# Patient Record
Sex: FEMALE | Race: OTHER | HISPANIC OR LATINO
[De-identification: names, ages, dates, MRNs, and addresses within clinical notes are randomized per-mention and may not be internally consistent; named-entity substitution may affect disease eponyms.]

---

## 2022-07-14 VITALS
RESPIRATION RATE: 18 BRPM | WEIGHT: 117.95 LBS | HEART RATE: 82 BPM | DIASTOLIC BLOOD PRESSURE: 95 MMHG | SYSTOLIC BLOOD PRESSURE: 163 MMHG | TEMPERATURE: 97 F | OXYGEN SATURATION: 100 %

## 2022-07-14 NOTE — H&P ADULT - ASSESSMENT
83 y/o Kinyarwanda speaking Female w/ PMHx HTN, HLD, DM-II (diet controlled), CVA (ischemic 3/31/21, no residual deficits), CAD (NSTEMI w/ Cardiogenic shock s/p ROSANA p/mLAD 6/4/2022 @[facility] [place] with residual disease) who in light of risk factors, CCS angina class II-III symptoms and known residual disease pt presents for staged PCI of RCA.    EKG: NSR @72bpm w/ TWI in I, aVL, deep TWI in V2-V6.  ASA 2			Mallampati class: 2         Anginal Class: 3    -No Known Allergies  -H/H = 12.7/39.3. Pt denies BRBPR, hematuria, hematochezia, melena. Pt reports good compliance w/ home ASA 81mg QD and Brilinta 90mg BID, stating her last dose of ASA was yesterday, and her last dose of Brilinta was this morning. Pt given ASA 81mg x1 and an additional Brilinta 90mg x1 pre-cath per d/w Dr. Chaparro.  -BUN/Cr = 17/1.06. EF normal. Euvolemic on exam. IV NS 250cc bolus given pre procedure    Sedation Plan:   Moderate  Patient Is Suitable Candidate For Sedation?     Yes    [employer] ID #[id number] ([email or web address])  Risks & benefits of procedure and alternative therapy have been explained to the patient including but not limited to: allergic reaction, bleeding with possible need for blood transfusion, infection, renal and vascular compromise, limb damage, arrhythmia, stroke, vessel dissection/perforation, myocardial infarction, and emergent CABG. Informed consent obtained at bedside and included in chart.

## 2022-07-14 NOTE — H&P ADULT - NSHPLABSRESULTS_GEN_ALL_CORE
12.7   6.12  )-----------( 262      ( 26 Jul 2022 08:00 )             39.3 12.7   6.12  )-----------( 262      ( 26 Jul 2022 08:00 )             39.3    07-26    140  |  106  |  17  ----------------------------<  101<H>  3.9   |  26  |  1.06    Ca    9.3      26 Jul 2022 08:17    TPro  7.6  /  Alb  4.6  /  TBili  0.6  /  DBili  x   /  AST  28  /  ALT  31  /  AlkPhos  77  07-26    PT/INR - ( 26 Jul 2022 08:00 )   PT: 12.7 sec;   INR: 1.07          PTT - ( 26 Jul 2022 08:00 )  PTT:31.9 sec

## 2022-07-14 NOTE — H&P ADULT - CARDIOVASCULAR
normal/regular rate and rhythm/S1 S2 present/no gallops/no rub/no murmur normal/regular rate and rhythm/S1 S2 present/no gallops/no rub/no murmur/vascular

## 2022-07-14 NOTE — H&P ADULT - NSICDXPASTMEDICALHX_GEN_ALL_CORE_FT
PAST MEDICAL HISTORY:  CAD (coronary artery disease) S/p NSTEMI    CVA (cerebrovascular accident)     Hyperlipidemia     Hypertension     Type 2 diabetes mellitus

## 2022-07-14 NOTE — H&P ADULT - HISTORY OF PRESENT ILLNESS
COVID:   Cardiologist: Dr Chaparro    Pharmacy:   Escort:      82 Y F with pmh HTN, HLD, DM II, CVA (Ischemic 3/31/21 residual?_), CAD (NSTEMI w/ Cardiogenic shock s/p ROSANA p/mLAD 6/4/2022 @[facility] [place] with residual disease) who presented to their cardiologist for f/u post NSTEMI and c/o chest tightness with numbness to her hands that started post cath.  Denies CORDOBA, orthopnea, PND, palpitations, diaphoresis, LE edema, dizziness, syncope, N/V/D, fever, chills.  Patient also reported ____ w/ DAPT. ECHO 6/4/22: EF 60% with grade II DD, mild-mod MR, mod TR, mild-mod PHTN.  In light of risk factors, CCS angina class II-III symptoms and known residual disease pt presents for staged PCI of RCA.        Cardiac cath @ [facility] [place] 6/4/22: LM large with mod diffuse disease, pLAD 50%, p/mLAD 99%, ostial/pLcx 80%, mLcx 90%, oRCA 90%, pRCA 95%, mRCA 80%, dRCA 80%.  ROSANA p/m LAD. EF 45% w/ inferoapical wall motion abnl.   C[facility]: [facility] [address] [facility]  Cardiologist: Dr Chaparro  Pharmacy: [facility] pharmacy  Escort: N/A, staged PCI    *Confirm meds on arrival  81 y/o Syriac speaking Female w/ PMHx HTN, HLD, DM-II (diet controlled), CVA (ischemic 3/31/21, no residual deficits), CAD (NSTEMI w/ Cardiogenic shock s/p ROSANA p/mLAD 6/4/2022 @[facility] [place] with residual disease) who presented to their cardiologist for f/u post NSTEMI and c/o chest tightness with numbness to her hands that started post cath. Since her prior procedure, pt reports good compliance w/ daily DAPT, and further denies CP, SOB, CORDOBA, palpitations, dizziness, LOC, N/V/D, fever/chills/sick contact, diaphoresis, orthopnea/PND, and leg swelling. Echo 6/4/22: EF 60% with G2DD, mild-mod MR, mod TR, mild-mod pHTN.  In light of risk factors, CCS angina class II-III symptoms and known residual disease pt presents for staged PCI of RCA.    Cardiac cath @ [facility] [place] 6/4/22: LM large with mod diffuse disease, pLAD 50%, p/mLAD 99%, ostial/pLcx 80%, mLcx 90%, oRCA 90%, pRCA 95%, mRCA 80%, dRCA 80%.  ROSANA p/m LAD. EF 45% w/ inferoapical wall motion abnl. COVID: [facility] [address] [facility]  Cardiologist: Dr Chaparro  Pharmacy: [facility] pharmacy  Escort: N/A, staged PCI    *Confirmed meds on arrival  83 y/o Citizen of [place] speaking Female w/ PMHx HTN, HLD, DM-II (diet controlled), CVA (ischemic 3/31/21, no residual deficits), CAD (NSTEMI w/ Cardiogenic shock s/p ROSANA p/mLAD 6/4/2022 @[facility] [place]) who presented to their cardiologist for f/u post NSTEMI and c/o chest tightness with numbness to her hands that started post cath. Since her prior procedure, pt reports good compliance w/ daily DAPT, and further denies CP, SOB, CORDOBA, palpitations, dizziness, LOC, N/V/D, fever/chills/sick contact, diaphoresis, orthopnea/PND, and leg swelling. Echo 6/4/22: EF 60% with G2DD, mild-mod MR, mod TR, mild-mod pHTN.  In light of risk factors, CCS angina class II-III symptoms and known residual disease pt presents for staged PCI of RCA.    Cardiac cath @ [facility] [place] 6/4/22: LM large with mod diffuse disease, pLAD 50%, p/mLAD 99%, ostial/pLcx 80%, mLcx 90%, oRCA 90%, pRCA 95%, mRCA 80%, dRCA 80%.  ROSANA p/m LAD. EF 45% w/ inferoapical wall motion abnl. COVID: Swabbed 7/23 @[facility] [address] [facility] [place]  Cardiologist: Dr Chaparro  Pharmacy: [facility] pharmacy  Escort: N/A, staged PCI    *Confirmed meds on arrival  81 y/o Tunisian speaking Female w/ PMHx HTN, HLD, DM-II (diet controlled), CVA (ischemic 3/31/21, no residual deficits), CAD (NSTEMI w/ Cardiogenic shock s/p ROSANA p/mLAD 6/4/2022 @[facility] [place] disease) who presented to their cardiologist for f/u post NSTEMI and c/o chest tightness. Since her prior procedure, pt reports good compliance w/ daily DAPT, and further denies CP, SOB, CORDOBA, palpitations, dizziness, LOC, N/V/D, fever/chills/sick contact, diaphoresis, orthopnea/PND, and leg swelling. Echo 6/4/22: EF 60% with G2DD, mild-mod MR, mod TR, mild-mod pHTN.  In light of risk factors, CCS angina class II-III symptoms and known residual disease pt presents for staged PCI of RCA.    Cardiac cath @ [facility] [place] 6/4/22: LM large with mod diffuse disease, pLAD 50%, p/mLAD 99%, ostial/pLcx 80%, mLcx 90%, oRCA 90%, pRCA 95%, mRCA 80%, dRCA 80%.  ROSANA p/m LAD. EF 45% w/ inferoapical wall motion abnl.

## 2022-07-26 ENCOUNTER — TRANSCRIPTION ENCOUNTER (OUTPATIENT)
Age: 82
End: 2022-07-26

## 2022-07-26 ENCOUNTER — INPATIENT (INPATIENT)
Facility: HOSPITAL | Age: 82
LOS: 0 days | Discharge: HOME | DRG: 247 | End: 2022-07-27
Attending: HOSPITALIST | Admitting: HOSPITALIST
Payer: MEDICARE

## 2022-07-26 LAB
A1C WITH ESTIMATED AVERAGE GLUCOSE RESULT: 4.7 % — SIGNIFICANT CHANGE UP (ref 4–5.6)
ALBUMIN SERPL ELPH-MCNC: 4.6 G/DL — SIGNIFICANT CHANGE UP (ref 3.3–5)
ALP SERPL-CCNC: 77 U/L — SIGNIFICANT CHANGE UP (ref 40–120)
ALT FLD-CCNC: 31 U/L — SIGNIFICANT CHANGE UP (ref 10–45)
ANION GAP SERPL CALC-SCNC: 8 MMOL/L — SIGNIFICANT CHANGE UP (ref 5–17)
APTT BLD: 31.9 SEC — SIGNIFICANT CHANGE UP (ref 27.5–35.5)
AST SERPL-CCNC: 28 U/L — SIGNIFICANT CHANGE UP (ref 10–40)
BASOPHILS # BLD AUTO: 0.06 K/UL — SIGNIFICANT CHANGE UP (ref 0–0.2)
BASOPHILS NFR BLD AUTO: 1 % — SIGNIFICANT CHANGE UP (ref 0–2)
BILIRUB SERPL-MCNC: 0.6 MG/DL — SIGNIFICANT CHANGE UP (ref 0.2–1.2)
BUN SERPL-MCNC: 17 MG/DL — SIGNIFICANT CHANGE UP (ref 7–23)
CALCIUM SERPL-MCNC: 9.3 MG/DL — SIGNIFICANT CHANGE UP (ref 8.4–10.5)
CHLORIDE SERPL-SCNC: 106 MMOL/L — SIGNIFICANT CHANGE UP (ref 96–108)
CHOLEST SERPL-MCNC: 109 MG/DL — SIGNIFICANT CHANGE UP
CK MB CFR SERPL CALC: 3.2 NG/ML — SIGNIFICANT CHANGE UP (ref 0–6.7)
CK SERPL-CCNC: 91 U/L — SIGNIFICANT CHANGE UP (ref 25–170)
CO2 SERPL-SCNC: 26 MMOL/L — SIGNIFICANT CHANGE UP (ref 22–31)
CREAT SERPL-MCNC: 1.06 MG/DL — SIGNIFICANT CHANGE UP (ref 0.5–1.3)
EGFR: 52 ML/MIN/1.73M2 — LOW
EOSINOPHIL # BLD AUTO: 0.2 K/UL — SIGNIFICANT CHANGE UP (ref 0–0.5)
EOSINOPHIL NFR BLD AUTO: 3.3 % — SIGNIFICANT CHANGE UP (ref 0–6)
ESTIMATED AVERAGE GLUCOSE: 88 MG/DL — SIGNIFICANT CHANGE UP (ref 68–114)
GLUCOSE BLDC GLUCOMTR-MCNC: 104 MG/DL — HIGH (ref 70–99)
GLUCOSE BLDC GLUCOMTR-MCNC: 106 MG/DL — HIGH (ref 70–99)
GLUCOSE BLDC GLUCOMTR-MCNC: 89 MG/DL — SIGNIFICANT CHANGE UP (ref 70–99)
GLUCOSE BLDC GLUCOMTR-MCNC: 91 MG/DL — SIGNIFICANT CHANGE UP (ref 70–99)
GLUCOSE SERPL-MCNC: 101 MG/DL — HIGH (ref 70–99)
HCT VFR BLD CALC: 39.3 % — SIGNIFICANT CHANGE UP (ref 34.5–45)
HDLC SERPL-MCNC: 58 MG/DL — SIGNIFICANT CHANGE UP
HGB BLD-MCNC: 12.7 G/DL — SIGNIFICANT CHANGE UP (ref 11.5–15.5)
IMM GRANULOCYTES NFR BLD AUTO: 0.2 % — SIGNIFICANT CHANGE UP (ref 0–1.5)
INR BLD: 1.07 — SIGNIFICANT CHANGE UP (ref 0.88–1.16)
ISTAT ACTK (ACTIVATED CLOTTING TIME KAOLIN): 294 SEC — HIGH (ref 74–137)
ISTAT INR: 1.3 — HIGH (ref 0.88–1.16)
ISTAT PT: 15.7 SEC — HIGH (ref 10–12.9)
LIPID PNL WITH DIRECT LDL SERPL: 37 MG/DL — SIGNIFICANT CHANGE UP
LYMPHOCYTES # BLD AUTO: 1.96 K/UL — SIGNIFICANT CHANGE UP (ref 1–3.3)
LYMPHOCYTES # BLD AUTO: 32 % — SIGNIFICANT CHANGE UP (ref 13–44)
MCHC RBC-ENTMCNC: 30.2 PG — SIGNIFICANT CHANGE UP (ref 27–34)
MCHC RBC-ENTMCNC: 32.3 GM/DL — SIGNIFICANT CHANGE UP (ref 32–36)
MCV RBC AUTO: 93.6 FL — SIGNIFICANT CHANGE UP (ref 80–100)
MONOCYTES # BLD AUTO: 0.73 K/UL — SIGNIFICANT CHANGE UP (ref 0–0.9)
MONOCYTES NFR BLD AUTO: 11.9 % — SIGNIFICANT CHANGE UP (ref 2–14)
NEUTROPHILS # BLD AUTO: 3.16 K/UL — SIGNIFICANT CHANGE UP (ref 1.8–7.4)
NEUTROPHILS NFR BLD AUTO: 51.6 % — SIGNIFICANT CHANGE UP (ref 43–77)
NON HDL CHOLESTEROL: 51 MG/DL — SIGNIFICANT CHANGE UP
NRBC # BLD: 0 /100 WBCS — SIGNIFICANT CHANGE UP (ref 0–0)
PLATELET # BLD AUTO: 262 K/UL — SIGNIFICANT CHANGE UP (ref 150–400)
POCT ISTAT CREATININE: 1 MG/DL — SIGNIFICANT CHANGE UP (ref 0.5–1.3)
POTASSIUM SERPL-MCNC: 3.9 MMOL/L — SIGNIFICANT CHANGE UP (ref 3.5–5.3)
POTASSIUM SERPL-SCNC: 3.9 MMOL/L — SIGNIFICANT CHANGE UP (ref 3.5–5.3)
PROT SERPL-MCNC: 7.6 G/DL — SIGNIFICANT CHANGE UP (ref 6–8.3)
PROTHROM AB SERPL-ACNC: 12.7 SEC — SIGNIFICANT CHANGE UP (ref 10.5–13.4)
RBC # BLD: 4.2 M/UL — SIGNIFICANT CHANGE UP (ref 3.8–5.2)
RBC # FLD: 13.2 % — SIGNIFICANT CHANGE UP (ref 10.3–14.5)
SODIUM SERPL-SCNC: 140 MMOL/L — SIGNIFICANT CHANGE UP (ref 135–145)
TRIGL SERPL-MCNC: 69 MG/DL — SIGNIFICANT CHANGE UP
WBC # BLD: 6.12 K/UL — SIGNIFICANT CHANGE UP (ref 3.8–10.5)
WBC # FLD AUTO: 6.12 K/UL — SIGNIFICANT CHANGE UP (ref 3.8–10.5)

## 2022-07-26 PROCEDURE — 93454 CORONARY ARTERY ANGIO S&I: CPT | Mod: 26,59

## 2022-07-26 PROCEDURE — 92978 ENDOLUMINL IVUS OCT C 1ST: CPT | Mod: 26,RC

## 2022-07-26 PROCEDURE — 93010 ELECTROCARDIOGRAM REPORT: CPT

## 2022-07-26 PROCEDURE — 99223 1ST HOSP IP/OBS HIGH 75: CPT | Mod: 25

## 2022-07-26 PROCEDURE — 0715T: CPT

## 2022-07-26 PROCEDURE — 99152 MOD SED SAME PHYS/QHP 5/>YRS: CPT

## 2022-07-26 PROCEDURE — 92928 PRQ TCAT PLMT NTRAC ST 1 LES: CPT | Mod: RC

## 2022-07-26 RX ORDER — SODIUM CHLORIDE 9 MG/ML
500 INJECTION INTRAMUSCULAR; INTRAVENOUS; SUBCUTANEOUS
Refills: 0 | Status: DISCONTINUED | OUTPATIENT
Start: 2022-07-26 | End: 2022-07-27

## 2022-07-26 RX ORDER — TICAGRELOR 90 MG/1
90 TABLET ORAL DAILY
Refills: 0 | Status: DISCONTINUED | OUTPATIENT
Start: 2022-07-27 | End: 2022-07-27

## 2022-07-26 RX ORDER — LEVOTHYROXINE SODIUM 125 MCG
125 TABLET ORAL DAILY
Refills: 0 | Status: DISCONTINUED | OUTPATIENT
Start: 2022-07-27 | End: 2022-07-27

## 2022-07-26 RX ORDER — DEXTROSE 50 % IN WATER 50 %
25 SYRINGE (ML) INTRAVENOUS ONCE
Refills: 0 | Status: DISCONTINUED | OUTPATIENT
Start: 2022-07-26 | End: 2022-07-27

## 2022-07-26 RX ORDER — LEVOTHYROXINE SODIUM 125 MCG
1 TABLET ORAL
Qty: 0 | Refills: 0 | DISCHARGE

## 2022-07-26 RX ORDER — LOSARTAN POTASSIUM 100 MG/1
25 TABLET, FILM COATED ORAL ONCE
Refills: 0 | Status: COMPLETED | OUTPATIENT
Start: 2022-07-26 | End: 2022-07-26

## 2022-07-26 RX ORDER — TICAGRELOR 90 MG/1
90 TABLET ORAL ONCE
Refills: 0 | Status: COMPLETED | OUTPATIENT
Start: 2022-07-26 | End: 2022-07-26

## 2022-07-26 RX ORDER — DEXTROSE 50 % IN WATER 50 %
12.5 SYRINGE (ML) INTRAVENOUS ONCE
Refills: 0 | Status: DISCONTINUED | OUTPATIENT
Start: 2022-07-26 | End: 2022-07-27

## 2022-07-26 RX ORDER — SODIUM CHLORIDE 9 MG/ML
1000 INJECTION, SOLUTION INTRAVENOUS
Refills: 0 | Status: DISCONTINUED | OUTPATIENT
Start: 2022-07-26 | End: 2022-07-27

## 2022-07-26 RX ORDER — ATORVASTATIN CALCIUM 80 MG/1
40 TABLET, FILM COATED ORAL AT BEDTIME
Refills: 0 | Status: DISCONTINUED | OUTPATIENT
Start: 2022-07-26 | End: 2022-07-27

## 2022-07-26 RX ORDER — SODIUM CHLORIDE 9 MG/ML
250 INJECTION INTRAMUSCULAR; INTRAVENOUS; SUBCUTANEOUS ONCE
Refills: 0 | Status: COMPLETED | OUTPATIENT
Start: 2022-07-26 | End: 2022-07-26

## 2022-07-26 RX ORDER — ASPIRIN/CALCIUM CARB/MAGNESIUM 324 MG
81 TABLET ORAL ONCE
Refills: 0 | Status: COMPLETED | OUTPATIENT
Start: 2022-07-26 | End: 2022-07-26

## 2022-07-26 RX ORDER — METOPROLOL TARTRATE 50 MG
50 TABLET ORAL DAILY
Refills: 0 | Status: DISCONTINUED | OUTPATIENT
Start: 2022-07-27 | End: 2022-07-27

## 2022-07-26 RX ORDER — INSULIN LISPRO 100/ML
VIAL (ML) SUBCUTANEOUS ONCE
Refills: 0 | Status: DISCONTINUED | OUTPATIENT
Start: 2022-07-26 | End: 2022-07-26

## 2022-07-26 RX ORDER — PANTOPRAZOLE SODIUM 20 MG/1
1 TABLET, DELAYED RELEASE ORAL
Qty: 0 | Refills: 0 | DISCHARGE

## 2022-07-26 RX ORDER — INSULIN LISPRO 100/ML
VIAL (ML) SUBCUTANEOUS ONCE
Refills: 0 | Status: COMPLETED | OUTPATIENT
Start: 2022-07-26 | End: 2022-07-26

## 2022-07-26 RX ORDER — ASPIRIN/CALCIUM CARB/MAGNESIUM 324 MG
81 TABLET ORAL DAILY
Refills: 0 | Status: DISCONTINUED | OUTPATIENT
Start: 2022-07-27 | End: 2022-07-27

## 2022-07-26 RX ORDER — DEXTROSE 50 % IN WATER 50 %
15 SYRINGE (ML) INTRAVENOUS ONCE
Refills: 0 | Status: DISCONTINUED | OUTPATIENT
Start: 2022-07-26 | End: 2022-07-27

## 2022-07-26 RX ORDER — LOSARTAN POTASSIUM 100 MG/1
25 TABLET, FILM COATED ORAL DAILY
Refills: 0 | Status: DISCONTINUED | OUTPATIENT
Start: 2022-07-26 | End: 2022-07-27

## 2022-07-26 RX ORDER — IPRATROPIUM BROMIDE 0.2 MG/ML
500 SOLUTION, NON-ORAL INHALATION ONCE
Refills: 0 | Status: COMPLETED | OUTPATIENT
Start: 2022-07-26 | End: 2022-07-26

## 2022-07-26 RX ORDER — GLUCAGON INJECTION, SOLUTION 0.5 MG/.1ML
1 INJECTION, SOLUTION SUBCUTANEOUS ONCE
Refills: 0 | Status: DISCONTINUED | OUTPATIENT
Start: 2022-07-26 | End: 2022-07-27

## 2022-07-26 RX ORDER — CHLORHEXIDINE GLUCONATE 213 G/1000ML
1 SOLUTION TOPICAL ONCE
Refills: 0 | Status: DISCONTINUED | OUTPATIENT
Start: 2022-07-26 | End: 2022-07-26

## 2022-07-26 RX ORDER — METOPROLOL TARTRATE 50 MG
1 TABLET ORAL
Qty: 0 | Refills: 0 | DISCHARGE

## 2022-07-26 RX ADMIN — TICAGRELOR 90 MILLIGRAM(S): 90 TABLET ORAL at 08:38

## 2022-07-26 RX ADMIN — LOSARTAN POTASSIUM 25 MILLIGRAM(S): 100 TABLET, FILM COATED ORAL at 18:46

## 2022-07-26 RX ADMIN — Medication 500 MICROGRAM(S): at 23:19

## 2022-07-26 RX ADMIN — SODIUM CHLORIDE 1000 MILLILITER(S): 9 INJECTION INTRAMUSCULAR; INTRAVENOUS; SUBCUTANEOUS at 09:11

## 2022-07-26 RX ADMIN — LOSARTAN POTASSIUM 25 MILLIGRAM(S): 100 TABLET, FILM COATED ORAL at 20:48

## 2022-07-26 RX ADMIN — ATORVASTATIN CALCIUM 40 MILLIGRAM(S): 80 TABLET, FILM COATED ORAL at 21:45

## 2022-07-26 RX ADMIN — Medication 81 MILLIGRAM(S): at 08:38

## 2022-07-26 RX ADMIN — SODIUM CHLORIDE 100 MILLILITER(S): 9 INJECTION INTRAMUSCULAR; INTRAVENOUS; SUBCUTANEOUS at 11:08

## 2022-07-26 NOTE — PATIENT PROFILE ADULT - FALL HARM RISK - HARM RISK INTERVENTIONS

## 2022-07-26 NOTE — DISCHARGE NOTE PROVIDER - CARE PROVIDER_API CALL
Russell Chaparro (DO)  Cardiovascular Disease; Interventional Cardiology  [address] [place] [address]  Phone: [phone]  Fax: [phone]  Established Patient  Follow Up Time: 2 weeks

## 2022-07-26 NOTE — DISCHARGE NOTE PROVIDER - NSDCMRMEDTOKEN_GEN_ALL_CORE_FT
Aspirin Enteric Coated 81 mg oral delayed release tablet: 1 tab(s) orally once a day  Brilinta (ticagrelor) 90 mg oral tablet: 1 tab(s) orally 2 times a day  Crestor 20 mg oral tablet: 1 tab(s) orally once a day  losartan 25 mg oral tablet: 1 tab(s) orally once a day  Synthroid 125 mcg (0.125 mg) oral tablet: 1 tab(s) orally once a day  Toprol-XL 50 mg oral tablet, extended release: 1 tab(s) orally once a day   Aspirin Enteric Coated 81 mg oral delayed release tablet: 1 tab(s) orally once a day  Brilinta (ticagrelor) 90 mg oral tablet: 1 tab(s) orally 2 times a day  Cardiac Rehab: Cardiac Rehab for dx CAD s/p stent placement: 3 days/week x 12 weeks     Cardiologist: Dr. Chaparro   Crestor 20 mg oral tablet: 1 tab(s) orally once a day  losartan 25 mg oral tablet: 1 tab(s) orally once a day  Synthroid 125 mcg (0.125 mg) oral tablet: 1 tab(s) orally once a day  Toprol-XL 50 mg oral tablet, extended release: 1 tab(s) orally once a day   Adult Aspirin Regimen 81 mg oral delayed release tablet: 1 tab(s) orally once a day  Brilinta (ticagrelor) 90 mg oral tablet: 1 tab(s) orally 2 times a day  Cardiac Rehab: Cardiac Rehab for dx CAD s/p stent placement: 3 days/week x 12 weeks     Cardiologist: Dr. Shankar Berry 50 mg oral tablet: 1 tab(s) orally once a day  Crestor 20 mg oral tablet: 1 tab(s) orally once a day  Synthroid 125 mcg (0.125 mg) oral tablet: 1 tab(s) orally once a day  Toprol-XL 50 mg oral tablet, extended release: 1 tab(s) orally once a day

## 2022-07-26 NOTE — DISCHARGE NOTE PROVIDER - NSDCFUADDINST_GEN_ALL_CORE_FT
You do not need to keep your access site (wrist, groin, or both) covered any more.  No lifting >5 pounds for 5 days.  No baths/swimming/soaking your access site for 5 days.  You may shower and wash your hand/wrist with soap and water gently, but do not scrub aggressively.  For any concerns please contact [phone], or go to your nearest Emergency Department. - Aspirin and Brilinta can put you at increased risk of bleeding; please avoid NSAIDS (such as Motrin, Advil, Ibuprofen, Naproxen, or Aleve, as these medications may further your risk of bleeding  - We have provided you with a prescription for cardiac rehab which is medically supervised exercise program for your heart and has been shown to improve the quantity and quality of life of people with heart disease like yours.   - You should attend cardiac rehab 3 times per week for 12 weeks. We have provided you with a list of nearby facilities. Please call your insurance carrier to determine which of these facilities are covered under your plan. Please bring this prescription with you to your follow up appointment with your cardiologist who can then further assist you to enroll into a cardiac rehab program.

## 2022-07-26 NOTE — CHART NOTE - NSCHARTNOTEFT_GEN_A_CORE
At 10:00 PM PA called to bedside, /80, HR 88bpm. Pt received additional Losartan 25mg PO x 1 at 8 PM. Pt c/o palpitations and wheezing. Repeat ECG grossly unchanged from prior. Lungs CTA however audible expiratory wheezing, R radial site stable. Will order Ipratropium neb x 1 and continue to monitor. At 10:00 PM PA called to bedside, spoke to pt in Indonesian. /80, HR 88bpm. Pt received additional Losartan 25mg PO x 1 at 8 PM. Pt is anxious about her BP being high, c/o palpitations and wheezing. No h/o Asthma/COPD. Repeat ECG grossly unchanged from prior. Lungs CTA however audible expiratory wheezing, R radial site stable. Will order Ipratropium neb x 1 and continue to monitor.

## 2022-07-26 NOTE — DISCHARGE NOTE PROVIDER - HOSPITAL COURSE
**Incomplete - needs med req and cardiac rehab rx**    81 y/o Romanian speaking Female w/ PMHx HTN, HLD, DM-II (diet controlled), CVA (ischemic 3/31/21, no residual deficits), CAD (NSTEMI w/ Cardiogenic shock s/p ROSANA p/mLAD 6/4/2022 @[facility] [place] with residual disease) who presented to their cardiologist for f/u post NSTEMI and c/o chest tightness. Since her prior procedure, pt reports good compliance w/ daily DAPT, and further denies CP, SOB, CORDOBA, palpitations, dizziness, LOC, N/V/D, fever/chills/sick contact, diaphoresis, orthopnea/PND, and leg swelling. Echo 6/4/22: EF 60% with G2DD, mild-mod MR, mod TR, mild-mod pHTN.  In light of risk factors, CCS angina class II-III symptoms and known residual disease pt presents for staged PCI of RCA.    s/p cardiac cath 7/26/22: IVUS guided shockwave/ROSANA x1 mRCA, ROSANA x1 to pRCA. Other findings: p-mLAD patent stents, oLCx 70%, dLCx 70%   Access: R radial artery s/p DSTAT band; no hematoma/bleeding.    Pt is asymptomatic at this time and denies chest pain, SOB, CORDOBA, palpitations, dizziness, LOC, N/V, diaphoresis, orthopnea/PND, and leg swelling. Pt able to ambulate and void without complication. VSS. Labs and telemetry reviewed. R radial access site stable and dressing C/D/I.  Pt is a candidate for discharge per Dr. Briceño. Pt given appropriate discharge instructions, pt states they have an appropriate amount of their previous home meds unchanged from this visit at home, and any new medications were sent to their pharmacy. Pt instructed to f/u with Cardiologist Dr. Chaparro in 1-2 weeks. **Incomplete - needs med req and cardiac rehab rx**    83 y/o Polish speaking Female w/ PMHx HTN, HLD, DM-II (diet controlled), CVA (ischemic 3/31/21, no residual deficits), CAD (NSTEMI w/ Cardiogenic shock s/p ROSANA p/mLAD 6/4/2022 @[facility] [place] with residual disease) who presented to their cardiologist for f/u post NSTEMI and c/o chest tightness. Since her prior procedure, pt reports good compliance w/ daily DAPT, and further denies CP, SOB, CORDOBA, palpitations, dizziness, LOC, N/V/D, fever/chills/sick contact, diaphoresis, orthopnea/PND, and leg swelling. Echo 6/4/22: EF 60% with G2DD, mild-mod MR, mod TR, mild-mod pHTN.  In light of risk factors, CCS angina class II-III symptoms and known residual disease pt presents for staged PCI of RCA.    s/p cardiac cath 7/26/22: IVUS guided shockwave/ROSANA x1 mRCA, ROSANA x1 to pRCA. Other findings: p-mLAD patent stents, oLCx 70%, dLCx 70%   Access: R radial artery s/p DSTAT band; no hematoma/bleeding.    Per d/w IC Fellow Dr. Brunner, no plan for staged PCI of LCx disease unless pt remains symptomatic or otherwise clinically indicated.    Pt is asymptomatic at this time and denies chest pain, SOB, CORDOBA, palpitations, dizziness, LOC, N/V, diaphoresis, orthopnea/PND, and leg swelling. Pt able to ambulate and void without complication. VSS. Labs and telemetry reviewed. R radial access site stable and dressing C/D/I.  Pt is a candidate for discharge per Dr. Briceño. Pt given appropriate discharge instructions, pt states they have an appropriate amount of their previous home meds unchanged from this visit at home, and any new medications were sent to their pharmacy. Pt instructed to f/u with Cardiologist Dr. Chaparro in 1-2 weeks. 81 y/o Turkish speaking Female w/ PMHx HTN, HLD, DM-II (diet controlled), CVA (ischemic 3/31/21, no residual deficits), CAD (NSTEMI w/ Cardiogenic shock s/p ROSANA p/mLAD 6/4/2022 @[facility] [place] with residual disease) who presented to their cardiologist for f/u post NSTEMI and c/o chest tightness. Since her prior procedure, pt reports good compliance w/ daily DAPT, and further denies CP, SOB, CORDOBA, palpitations, dizziness, LOC, N/V/D, fever/chills/sick contact, diaphoresis, orthopnea/PND, and leg swelling. Echo 6/4/22: EF 60% with G2DD, mild-mod MR, mod TR, mild-mod pHTN.  In light of risk factors, CCS angina class II-III symptoms and known residual disease pt presents for staged PCI of RCA.    Patient now s/p cardiac cath 7/26/22: IVUS guided shockwave/ROSANA x1 mRCA, ROSANA x1 to pRCA. Other findings: p-mLAD patent stents, oLCx 70%, dLCx 70% Access: R radial artery s/p DSTAT band; no hematoma/bleeding.Per d/w IC Fellow Dr. Brunner, no plan for staged PCI of LCx disease unless pt remains symptomatic or otherwise clinically indicated.    Pt is asymptomatic at this time and denies chest pain, SOB, CORDOBA, palpitations, dizziness, LOC, N/V, diaphoresis, orthopnea/PND, and leg swelling. Pt able to ambulate and void without complication. VSS. Labs and telemetry reviewed. R radial access site stable and dressing C/D/I.  Pt is a candidate for discharge. Pt given appropriate discharge instructions, pt states they have an appropriate amount of their previous home meds unchanged from this visit at home, and any new medications were sent to their pharmacy. Pt instructed to f/u with Cardiologist Dr. Chaparro in 1-2 weeks.             Cardiac Rehab (STEMI/NSTEMI/ACS/Unstable Angina/CHF/post-PCI):            *Education on benefits of Cardiac Rehab provided to patient: Yes         *Referral and Prescription Given for Cardiac Rehab : Yes         *Pt given list of locations & instructed to contact their insurance company to review list of participating providers    DC Meds: 83 y/o Maltese speaking Female w/ PMHx HTN, HLD, DM-II (diet controlled), CVA (ischemic 3/31/21, no residual deficits), CAD (NSTEMI w/ Cardiogenic shock s/p ROSANA p/mLAD 6/4/2022 @[facility] [place] with residual disease) who presented to their cardiologist for f/u post NSTEMI and c/o chest tightness. Since her prior procedure, pt reports good compliance w/ daily DAPT, and further denies CP, SOB, CORDOBA, palpitations, dizziness, LOC, N/V/D, fever/chills/sick contact, diaphoresis, orthopnea/PND, and leg swelling. Echo 6/4/22: EF 60% with G2DD, mild-mod MR, mod TR, mild-mod pHTN.  In light of risk factors, CCS angina class II-III symptoms and known residual disease pt presents for staged PCI of RCA.    Patient now s/p cardiac cath 7/26/22: IVUS guided shockwave/ROSANA x1 mRCA, ROSANA x1 to pRCA. Other findings: p-mLAD patent stents, oLCx 70%, dLCx 70% Access: R radial artery s/p DSTAT band; no hematoma/bleeding.Per d/w IC Fellow Dr. Brunner, no plan for staged PCI of LCx disease unless pt remains symptomatic or otherwise clinically indicated.    Pt is asymptomatic at this time and denies chest pain, SOB, CORDOBA, palpitations, dizziness, LOC, N/V, diaphoresis, orthopnea/PND, and leg swelling. Pt able to ambulate and void without complication. VSS. Labs and telemetry reviewed. R radial access site stable and dressing C/D/I.  Pt is a candidate for discharge. Pt given appropriate discharge instructions, pt states they have an appropriate amount of their previous home meds unchanged from this visit at home, and any new medications were sent to their pharmacy. Pt instructed to f/u with Cardiologist Dr. Chaparro in 1-2 weeks.             Cardiac Rehab (STEMI/NSTEMI/ACS/Unstable Angina/CHF/post-PCI):            *Education on benefits of Cardiac Rehab provided to patient: Yes         *Referral and Prescription Given for Cardiac Rehab : Yes         *Pt given list of locations & instructed to contact their insurance company to review list of participating providers    DC Meds:          83 y/o Lao speaking Female w/ PMHx HTN, HLD, DM-II (diet controlled), CVA (ischemic 3/31/21, no residual deficits), CAD (NSTEMI w/ Cardiogenic shock s/p ROSANA p/mLAD 6/4/2022 @[facility] [place] [place] with residual disease) who presented to their cardiologist for f/u post NSTEMI and c/o chest tightness. Since her prior procedure, pt reports good compliance w/ daily DAPT, and further denies CP, SOB, CORDOBA, palpitations, dizziness, LOC, N/V/D, fever/chills/sick contact, diaphoresis, orthopnea/PND, and leg swelling. Echo 6/4/22: EF 60% with G2DD, mild-mod MR, mod TR, mild-mod pHTN.  In light of risk factors, CCS angina class II-III symptoms and known residual disease pt presents for staged PCI of RCA.    Patient now s/p cardiac cath 7/26/22: IVUS guided shockwave/ROSANA x1 mRCA, ROSANA x1 to pRCA. Other findings: p-mLAD patent stents, oLCx 70%, dLCx 70% Access: R radial artery s/p DSTAT band; no hematoma/bleeding.Per d/w IC Fellow Dr. Brunner, no plan for staged PCI of LCx disease unless pt remains symptomatic or otherwise clinically indicated.    Pt is asymptomatic at this time and denies chest pain, SOB, CORDOBA, palpitations, dizziness, LOC, N/V, diaphoresis, orthopnea/PND, and leg swelling. Pt able to ambulate and void without complication. VSS. Labs and telemetry reviewed. R radial access site stable and dressing C/D/I.  Pt is a candidate for discharge. Pt given appropriate discharge instructions, pt states they have an appropriate amount of their previous home meds unchanged from this visit at home, and any new medications were sent to their pharmacy. Pt instructed to f/u with Cardiologist Dr. Chaparro in 1-2 weeks.            Cardiac Rehab (STEMI/NSTEMI/ACS/Unstable Angina/CHF/post-PCI):            *Education on benefits of Cardiac Rehab provided to patient: Yes         *Referral and Prescription Given for Cardiac Rehab : Yes         *Pt given list of locations & instructed to contact their insurance company to review list of participating providers    DC Meds:     ASA 81mg  Brilinta 90mg BID  Losartan 50mg  Metoprolol Succinate 50mg  Crestor 20mg

## 2022-07-26 NOTE — PATIENT PROFILE ADULT - FUNCTIONAL SCREEN CURRENT LEVEL: COMMUNICATION, MLM
doses of ibuprofen ( from 800 to 1,600 mg total dose per day), in divided doses, with meals, depending on the size of the mass and symptoms, for the next 3-4 weeks. I will leave the final decisions regarding medication and dosing up to her medical providers. If the mass enlarges and/or becomes more symptomatic, despite medical treatment, she should be referred back for follow up and possible surgery.
0 = understands/communicates without difficulty

## 2022-07-26 NOTE — DISCHARGE NOTE PROVIDER - NSDCCPCAREPLAN_GEN_ALL_CORE_FT
PRINCIPAL DISCHARGE DIAGNOSIS  Diagnosis: CAD (coronary artery disease)  Assessment and Plan of Treatment: You have a diagnosis of coronary artery disease and received a stent to your coronary artery.  You have been started on Aspirin 81mg daily and Brilinta(Ticagrelor) 90mg Twice Daily. You MUST continue taking the daily Aspirin and Brilinta to ensure a clot does not form in your stent. DO NOT STOP THESE MEDICATIONS FOR ANY REASON UNLESS OTHERWISE INDICATED BY YOUR CARDIOLOGIST BECAUSE THIS WILL PUT YOU AT RISK FOR A HEART ATTACK. You should refrain from strenuous activity and heavy lifting for 5 days. Please make a follow up appointment with your cardiologist within 1-2 weeks of your discharge. All of your prescriptions have been sent electronically to your pharmacy.         PRINCIPAL DISCHARGE DIAGNOSIS  Diagnosis: CAD (coronary artery disease)  Assessment and Plan of Treatment: - You have a known history of coronary artery disease with stents to open the arteries and improve the blood flow to the heart.   You have a diagnosis of coronary artery disease and received a stent to your coronary artery.  You have been started on Aspirin 81mg daily and Brilinta(Ticagrelor) 90mg Twice Daily. You MUST continue taking the daily Aspirin and Brilinta to ensure a clot does not form in your stent. DO NOT STOP THESE MEDICATIONS FOR ANY REASON UNLESS OTHERWISE INDICATED BY YOUR CARDIOLOGIST BECAUSE THIS WILL PUT YOU AT RISK FOR A HEART ATTACK. You should refrain from strenuous activity and heavy lifting for 5 days. Please make a follow up appointment with your cardiologist within 1-2 weeks of your discharge. All of your prescriptions have been sent electronically to your pharmacy.         PRINCIPAL DISCHARGE DIAGNOSIS  Diagnosis: CAD (coronary artery disease)  Assessment and Plan of Treatment: - You have a known history of coronary artery disease with stents to open the arteries and improve the blood flow to the heart.  You came to the hospital for a planned caardia cath as you had known residual disease to the right coronary artery for which you received 2 STENTS.   - NEVER MISS A DOSE OF ASPIRIN OR BRILINTA. IF YOU DO, YOU ARE AT RISK OF YOUR STENTS CLOSING AND HAVING A HEART ATTACK. DO NOT STOP THESE TWO MEDICATIONS UNLESS INSTRUCTED TO DO SO BY YOUR CARDIOLOGIST.   - Do NOT drive or operate hazardous machinery for 24 hours. Limit your physical activity for 24-48 hours. Do NOT engage in sports, heavy work or heavy lifting for 72 hours.   - You MAY shower BUT no TUB BATHS, HOT TUBS OR SWIMMING FOR 5 DAYS  - Your procedure was done through your right wrist. If you observe flank bleeding from the puncture site, it is an emergency. Please put direct pressure on the site and go directly to the ER. Bleeding under the skin may also occur and a small "black and blue" may be expected. If the area appears to be expanding or swelling around the puncture site, apply manual compression and go immediately to the nearest ER. If your arm/hand becomes cool or blue and/or you are unable to move it, this must be treated as an emergency, go directly to the nearest ER. Look for signs of infection in the wrist: fever, red streaking of the arm, obvious pus formation and pain.        SECONDARY DISCHARGE DIAGNOSES  Diagnosis: Hypertension  Assessment and Plan of Treatment: - Hypertension, commonly called high blood pressure, is when the force of blood pumping through your arteries is too strong. Hypertension forces your heart to work harder to pump blood. Your arteries may become narrow or stiff. Having untreated or uncontrolled hypertension for a long period of time can cause heart attack, stroke, kidney disease, and other problems.   - Be sure to follow a low salt diet. If you have been prescribed antihypertensive medications to control your blood pressure, be sure to take them every day as prescribed and do not miss any doses, the medications do not work if they are not taken consistently. Follow up with your Primary Care Doctor and have your Blood Pressure checked.     PRINCIPAL DISCHARGE DIAGNOSIS  Diagnosis: CAD (coronary artery disease)  Assessment and Plan of Treatment: - You have a known history of coronary artery disease with stents to open the arteries and improve the blood flow to the heart.  You came to the hospital for a planned caardiac cath as you had known residual disease to the right coronary artery for which you received 2 STENTS.   - NEVER MISS A DOSE OF ASPIRIN OR BRILINTA. IF YOU DO, YOU ARE AT RISK OF YOUR STENTS CLOSING AND HAVING A HEART ATTACK. DO NOT STOP THESE TWO MEDICATIONS UNLESS INSTRUCTED TO DO SO BY YOUR CARDIOLOGIST.   - Do NOT drive or operate hazardous machinery for 24 hours. Limit your physical activity for 24-48 hours. Do NOT engage in sports, heavy work or heavy lifting for 72 hours.   - You MAY shower BUT no TUB BATHS, HOT TUBS OR SWIMMING FOR 5 DAYS  - Your procedure was done through your right wrist. If you observe flank bleeding from the puncture site, it is an emergency. Please put direct pressure on the site and go directly to the ER. Bleeding under the skin may also occur and a small "black and blue" may be expected. If the area appears to be expanding or swelling around the puncture site, apply manual compression and go immediately to the nearest ER. If your arm/hand becomes cool or blue and/or you are unable to move it, this must be treated as an emergency, go directly to the nearest ER. Look for signs of infection in the wrist: fever, red streaking of the arm, obvious pus formation and pain.        SECONDARY DISCHARGE DIAGNOSES  Diagnosis: Hypertension  Assessment and Plan of Treatment: - Hypertension, commonly called high blood pressure, is when the force of blood pumping through your arteries is too strong WHICH YOU HAVE A KNOWN HISTORY OF. Hypertension forces your heart to work harder to pump blood. Your arteries may become narrow or stiff. Having untreated or uncontrolled hypertension for a long period of time can cause heart attack, stroke, kidney disease, and other problems.   - Your blood pressure was higher than normal while you were in the hospital. You are to INCREASE Losartan 25mg to Losartan 50mg and CONTINUE Toprol XL 50mg daily.   - Be sure to follow a low salt diet. If you have been prescribed antihypertensive medications to control your blood pressure, be sure to take them every day as prescribed and do not miss any doses, the medications do not work if they are not taken consistently.

## 2022-07-27 ENCOUNTER — TRANSCRIPTION ENCOUNTER (OUTPATIENT)
Age: 82
End: 2022-07-27

## 2022-07-27 VITALS — TEMPERATURE: 98 F

## 2022-07-27 LAB
ANION GAP SERPL CALC-SCNC: 10 MMOL/L — SIGNIFICANT CHANGE UP (ref 5–17)
BUN SERPL-MCNC: 13 MG/DL — SIGNIFICANT CHANGE UP (ref 7–23)
CALCIUM SERPL-MCNC: 8.8 MG/DL — SIGNIFICANT CHANGE UP (ref 8.4–10.5)
CHLORIDE SERPL-SCNC: 106 MMOL/L — SIGNIFICANT CHANGE UP (ref 96–108)
CO2 SERPL-SCNC: 23 MMOL/L — SIGNIFICANT CHANGE UP (ref 22–31)
CREAT SERPL-MCNC: 0.73 MG/DL — SIGNIFICANT CHANGE UP (ref 0.5–1.3)
EGFR: 82 ML/MIN/1.73M2 — SIGNIFICANT CHANGE UP
GLUCOSE SERPL-MCNC: 91 MG/DL — SIGNIFICANT CHANGE UP (ref 70–99)
HCT VFR BLD CALC: 34.8 % — SIGNIFICANT CHANGE UP (ref 34.5–45)
HGB BLD-MCNC: 11.8 G/DL — SIGNIFICANT CHANGE UP (ref 11.5–15.5)
MAGNESIUM SERPL-MCNC: 1.9 MG/DL — SIGNIFICANT CHANGE UP (ref 1.6–2.6)
MCHC RBC-ENTMCNC: 30.6 PG — SIGNIFICANT CHANGE UP (ref 27–34)
MCHC RBC-ENTMCNC: 33.9 GM/DL — SIGNIFICANT CHANGE UP (ref 32–36)
MCV RBC AUTO: 90.4 FL — SIGNIFICANT CHANGE UP (ref 80–100)
NRBC # BLD: 0 /100 WBCS — SIGNIFICANT CHANGE UP (ref 0–0)
PLATELET # BLD AUTO: 218 K/UL — SIGNIFICANT CHANGE UP (ref 150–400)
POTASSIUM SERPL-MCNC: 3.5 MMOL/L — SIGNIFICANT CHANGE UP (ref 3.5–5.3)
POTASSIUM SERPL-SCNC: 3.5 MMOL/L — SIGNIFICANT CHANGE UP (ref 3.5–5.3)
RBC # BLD: 3.85 M/UL — SIGNIFICANT CHANGE UP (ref 3.8–5.2)
RBC # FLD: 13.1 % — SIGNIFICANT CHANGE UP (ref 10.3–14.5)
SODIUM SERPL-SCNC: 139 MMOL/L — SIGNIFICANT CHANGE UP (ref 135–145)
WBC # BLD: 6.77 K/UL — SIGNIFICANT CHANGE UP (ref 3.8–10.5)
WBC # FLD AUTO: 6.77 K/UL — SIGNIFICANT CHANGE UP (ref 3.8–10.5)

## 2022-07-27 PROCEDURE — 82565 ASSAY OF CREATININE: CPT

## 2022-07-27 PROCEDURE — C1725: CPT

## 2022-07-27 PROCEDURE — 80061 LIPID PANEL: CPT

## 2022-07-27 PROCEDURE — 82962 GLUCOSE BLOOD TEST: CPT

## 2022-07-27 PROCEDURE — 80053 COMPREHEN METABOLIC PANEL: CPT

## 2022-07-27 PROCEDURE — 93005 ELECTROCARDIOGRAM TRACING: CPT

## 2022-07-27 PROCEDURE — 85027 COMPLETE CBC AUTOMATED: CPT

## 2022-07-27 PROCEDURE — 80048 BASIC METABOLIC PNL TOTAL CA: CPT

## 2022-07-27 PROCEDURE — C1887: CPT

## 2022-07-27 PROCEDURE — 94640 AIRWAY INHALATION TREATMENT: CPT

## 2022-07-27 PROCEDURE — C1894: CPT

## 2022-07-27 PROCEDURE — 93010 ELECTROCARDIOGRAM REPORT: CPT

## 2022-07-27 PROCEDURE — C1761: CPT

## 2022-07-27 PROCEDURE — 82550 ASSAY OF CK (CPK): CPT

## 2022-07-27 PROCEDURE — C1874: CPT

## 2022-07-27 PROCEDURE — 36415 COLL VENOUS BLD VENIPUNCTURE: CPT

## 2022-07-27 PROCEDURE — 83036 HEMOGLOBIN GLYCOSYLATED A1C: CPT

## 2022-07-27 PROCEDURE — C1769: CPT

## 2022-07-27 PROCEDURE — C1753: CPT

## 2022-07-27 PROCEDURE — 82553 CREATINE MB FRACTION: CPT

## 2022-07-27 PROCEDURE — 83735 ASSAY OF MAGNESIUM: CPT

## 2022-07-27 PROCEDURE — 85610 PROTHROMBIN TIME: CPT

## 2022-07-27 PROCEDURE — 99239 HOSP IP/OBS DSCHRG MGMT >30: CPT

## 2022-07-27 PROCEDURE — 85730 THROMBOPLASTIN TIME PARTIAL: CPT

## 2022-07-27 PROCEDURE — 85025 COMPLETE CBC W/AUTO DIFF WBC: CPT

## 2022-07-27 PROCEDURE — 85347 COAGULATION TIME ACTIVATED: CPT

## 2022-07-27 RX ORDER — TICAGRELOR 90 MG/1
1 TABLET ORAL
Qty: 60 | Refills: 7
Start: 2022-07-27 | End: 2023-03-23

## 2022-07-27 RX ORDER — ASPIRIN/CALCIUM CARB/MAGNESIUM 324 MG
1 TABLET ORAL
Qty: 30 | Refills: 10
Start: 2022-07-27 | End: 2023-06-21

## 2022-07-27 RX ORDER — TICAGRELOR 90 MG/1
1 TABLET ORAL
Qty: 0 | Refills: 0 | DISCHARGE

## 2022-07-27 RX ORDER — ROSUVASTATIN CALCIUM 5 MG/1
1 TABLET ORAL
Qty: 0 | Refills: 0 | DISCHARGE

## 2022-07-27 RX ORDER — ROSUVASTATIN CALCIUM 5 MG/1
1 TABLET ORAL
Qty: 30 | Refills: 4
Start: 2022-07-27 | End: 2022-12-23

## 2022-07-27 RX ORDER — AMLODIPINE BESYLATE 2.5 MG/1
5 TABLET ORAL ONCE
Refills: 0 | Status: COMPLETED | OUTPATIENT
Start: 2022-07-27 | End: 2022-07-27

## 2022-07-27 RX ORDER — LOSARTAN POTASSIUM 100 MG/1
1 TABLET, FILM COATED ORAL
Qty: 30 | Refills: 1
Start: 2022-07-27 | End: 2022-09-24

## 2022-07-27 RX ORDER — MAGNESIUM OXIDE 400 MG ORAL TABLET 241.3 MG
400 TABLET ORAL ONCE
Refills: 0 | Status: COMPLETED | OUTPATIENT
Start: 2022-07-27 | End: 2022-07-27

## 2022-07-27 RX ORDER — POTASSIUM CHLORIDE 20 MEQ
40 PACKET (EA) ORAL ONCE
Refills: 0 | Status: COMPLETED | OUTPATIENT
Start: 2022-07-27 | End: 2022-07-27

## 2022-07-27 RX ORDER — LOSARTAN POTASSIUM 100 MG/1
1 TABLET, FILM COATED ORAL
Qty: 0 | Refills: 0 | DISCHARGE

## 2022-07-27 RX ORDER — ASPIRIN/CALCIUM CARB/MAGNESIUM 324 MG
1 TABLET ORAL
Qty: 0 | Refills: 0 | DISCHARGE

## 2022-07-27 RX ADMIN — MAGNESIUM OXIDE 400 MG ORAL TABLET 400 MILLIGRAM(S): 241.3 TABLET ORAL at 07:43

## 2022-07-27 RX ADMIN — Medication 125 MICROGRAM(S): at 06:42

## 2022-07-27 RX ADMIN — LOSARTAN POTASSIUM 25 MILLIGRAM(S): 100 TABLET, FILM COATED ORAL at 06:49

## 2022-07-27 RX ADMIN — TICAGRELOR 90 MILLIGRAM(S): 90 TABLET ORAL at 11:18

## 2022-07-27 RX ADMIN — Medication 81 MILLIGRAM(S): at 11:18

## 2022-07-27 RX ADMIN — Medication 40 MILLIEQUIVALENT(S): at 07:41

## 2022-07-27 RX ADMIN — Medication 50 MILLIGRAM(S): at 06:42

## 2022-07-27 RX ADMIN — AMLODIPINE BESYLATE 5 MILLIGRAM(S): 2.5 TABLET ORAL at 00:22

## 2022-07-27 NOTE — DISCHARGE NOTE NURSING/CASE MANAGEMENT/SOCIAL WORK - NSDCPEFALRISK_GEN_ALL_CORE
For information on Fall & Injury Prevention, visit: [email or web address] OR  [email or web address] OR  https://www.cdc.gov/steadi/patient.html

## 2022-07-27 NOTE — DISCHARGE NOTE NURSING/CASE MANAGEMENT/SOCIAL WORK - PATIENT PORTAL LINK FT
You can access the FollowMyHealth Patient Portal offered by [facility] by registering at the following website: [email or web address]. By joining [facility]’s FollowMyHealth portal, you will also be able to view your health information using other applications (apps) compatible with our system.

## 2022-07-30 DIAGNOSIS — I27.20 PULMONARY HYPERTENSION, UNSPECIFIED: ICD-10-CM

## 2022-07-30 DIAGNOSIS — I34.0 NONRHEUMATIC MITRAL (VALVE) INSUFFICIENCY: ICD-10-CM

## 2022-07-30 DIAGNOSIS — Z79.82 LONG TERM (CURRENT) USE OF ASPIRIN: ICD-10-CM

## 2022-07-30 DIAGNOSIS — I25.2 OLD MYOCARDIAL INFARCTION: ICD-10-CM

## 2022-07-30 DIAGNOSIS — Z79.02 LONG TERM (CURRENT) USE OF ANTITHROMBOTICS/ANTIPLATELETS: ICD-10-CM

## 2022-07-30 DIAGNOSIS — I25.84 CORONARY ATHEROSCLEROSIS DUE TO CALCIFIED CORONARY LESION: ICD-10-CM

## 2022-07-30 DIAGNOSIS — E11.9 TYPE 2 DIABETES MELLITUS WITHOUT COMPLICATIONS: ICD-10-CM

## 2022-07-30 DIAGNOSIS — Z95.5 PRESENCE OF CORONARY ANGIOPLASTY IMPLANT AND GRAFT: ICD-10-CM

## 2022-07-30 DIAGNOSIS — I25.10 ATHEROSCLEROTIC HEART DISEASE OF NATIVE CORONARY ARTERY WITHOUT ANGINA PECTORIS: ICD-10-CM

## 2022-07-30 DIAGNOSIS — I36.1 NONRHEUMATIC TRICUSPID (VALVE) INSUFFICIENCY: ICD-10-CM

## 2022-07-30 DIAGNOSIS — R06.2 WHEEZING: ICD-10-CM

## 2022-07-30 DIAGNOSIS — I10 ESSENTIAL (PRIMARY) HYPERTENSION: ICD-10-CM

## 2022-07-30 DIAGNOSIS — E78.5 HYPERLIPIDEMIA, UNSPECIFIED: ICD-10-CM

## 2022-07-30 DIAGNOSIS — Z86.73 PERSONAL HISTORY OF TRANSIENT ISCHEMIC ATTACK (TIA), AND CEREBRAL INFARCTION WITHOUT RESIDUAL DEFICITS: ICD-10-CM

## 2024-01-18 PROBLEM — Z00.00 ENCOUNTER FOR PREVENTIVE HEALTH EXAMINATION: Status: ACTIVE | Noted: 2024-01-18

## 2024-04-23 NOTE — DISCHARGE NOTE PROVIDER - NSDCACTIVITY_GEN_ALL_CORE
Dear Jose Alberto,    For now,  Please continue to give Lantus 8 units daily.    Please continue to give 1 unit : 30 grams of carbohydrate before you eat.   If you blood sugar >150, please also add correction as follows.      Correction Scale - 1 unit : 60 mg/dl >150     Do Not give Correction Insulin if Pre-Meal BG less than 150   -210 = 1 unit  -270 = 2 units.  -330 = 3 units.  -390 = 4 units.  -450 = 5 units.  BG >450 = 6 units.  To be given with mealtime insulin, and based on blood glucose before your meal.        Do Not give Bedtime Correction Insulin if BG less than 210  -270 = 1 units.  -330 = 2 units.  -390 = 3 units.  -450 = 4 units.  BG >450 = 5 units.  To be given with mealtime insulin, and based on blood glucose before your meal.      Please record doses in isamar to review with Mildred.    My best wishes,    Caro Cormier PA-C, MPAS  [facility]  Diabetes, Endocrinology, and Metabolism  [phone] Appointments/Nurse  [phone] Fax          
Showering allowed/Stairs allowed/Walking - Indoors allowed/No heavy lifting/straining/Walking - Outdoors allowed

## 2024-10-06 NOTE — DISCHARGE NOTE PROVIDER - NSDCQMAMI_CARD_ALL_CORE
Orientation to room/Bed in low position, brakes on/Side rails x 2 or 4 up, assess large gaps, such that a patient could get extremity or other body part entrapped, use additional safety procedures/Call light is within reach, educate patient/family on its functionality/Patient and family education available to parents and patient/Educate patient/parents of falls protocol precautions/Consider moving patient closer to nurses' station
No

## 2025-04-01 PROBLEM — E78.5 HYPERLIPIDEMIA, UNSPECIFIED: Chronic | Status: ACTIVE | Noted: 2022-07-14

## 2025-04-01 PROBLEM — I63.9 CEREBRAL INFARCTION, UNSPECIFIED: Chronic | Status: ACTIVE | Noted: 2022-07-14

## 2025-04-01 PROBLEM — I25.10 ATHEROSCLEROTIC HEART DISEASE OF NATIVE CORONARY ARTERY WITHOUT ANGINA PECTORIS: Chronic | Status: ACTIVE | Noted: 2022-07-14

## 2025-04-01 PROBLEM — E11.9 TYPE 2 DIABETES MELLITUS WITHOUT COMPLICATIONS: Chronic | Status: ACTIVE | Noted: 2022-07-14

## 2025-04-01 PROBLEM — I10 ESSENTIAL (PRIMARY) HYPERTENSION: Chronic | Status: ACTIVE | Noted: 2022-07-14

## 2025-04-08 ENCOUNTER — APPOINTMENT (OUTPATIENT)
Dept: FAMILY MEDICINE | Facility: CLINIC | Age: 85
End: 2025-04-08

## 2025-04-08 ENCOUNTER — NON-APPOINTMENT (OUTPATIENT)
Age: 85
End: 2025-04-08

## 2025-04-15 ENCOUNTER — APPOINTMENT (OUTPATIENT)
Dept: FAMILY MEDICINE | Facility: CLINIC | Age: 85
End: 2025-04-15

## 2025-07-09 ENCOUNTER — INPATIENT (INPATIENT)
Facility: HOSPITAL | Age: 85
LOS: 5 days | Discharge: HOME | DRG: 884 | End: 2025-07-15
Attending: STUDENT IN AN ORGANIZED HEALTH CARE EDUCATION/TRAINING PROGRAM | Admitting: HOSPITALIST
Payer: COMMERCIAL

## 2025-07-09 VITALS
RESPIRATION RATE: 18 BRPM | OXYGEN SATURATION: 96 % | DIASTOLIC BLOOD PRESSURE: 93 MMHG | SYSTOLIC BLOOD PRESSURE: 179 MMHG | HEART RATE: 95 BPM | TEMPERATURE: 98 F

## 2025-07-09 LAB
ALBUMIN SERPL ELPH-MCNC: 3.9 G/DL — SIGNIFICANT CHANGE UP (ref 3.3–5.2)
ALP SERPL-CCNC: 63 U/L — SIGNIFICANT CHANGE UP (ref 40–120)
ALT FLD-CCNC: 26 U/L — SIGNIFICANT CHANGE UP
AMPHET UR-MCNC: NEGATIVE — SIGNIFICANT CHANGE UP
ANION GAP SERPL CALC-SCNC: 15 MMOL/L — SIGNIFICANT CHANGE UP (ref 5–17)
APAP SERPL-MCNC: <3 UG/ML — LOW (ref 10–26)
APPEARANCE UR: CLEAR — SIGNIFICANT CHANGE UP
AST SERPL-CCNC: 37 U/L — HIGH
BACTERIA # UR AUTO: NEGATIVE /HPF — SIGNIFICANT CHANGE UP
BARBITURATES UR SCN-MCNC: NEGATIVE — SIGNIFICANT CHANGE UP
BASOPHILS # BLD AUTO: 0.05 K/UL — SIGNIFICANT CHANGE UP (ref 0–0.2)
BASOPHILS NFR BLD AUTO: 1.1 % — SIGNIFICANT CHANGE UP (ref 0–2)
BENZODIAZ UR-MCNC: NEGATIVE — SIGNIFICANT CHANGE UP
BILIRUB SERPL-MCNC: 0.3 MG/DL — LOW (ref 0.4–2)
BILIRUB UR-MCNC: NEGATIVE — SIGNIFICANT CHANGE UP
BUN SERPL-MCNC: 17.6 MG/DL — SIGNIFICANT CHANGE UP (ref 8–20)
CALCIUM SERPL-MCNC: 8.6 MG/DL — SIGNIFICANT CHANGE UP (ref 8.4–10.5)
CAST: 0 /LPF — SIGNIFICANT CHANGE UP (ref 0–4)
CHLORIDE SERPL-SCNC: 104 MMOL/L — SIGNIFICANT CHANGE UP (ref 96–108)
CO2 SERPL-SCNC: 23 MMOL/L — SIGNIFICANT CHANGE UP (ref 22–29)
COCAINE METAB.OTHER UR-MCNC: NEGATIVE — SIGNIFICANT CHANGE UP
COLOR SPEC: YELLOW — SIGNIFICANT CHANGE UP
CREAT SERPL-MCNC: 1.04 MG/DL — SIGNIFICANT CHANGE UP (ref 0.5–1.3)
DIFF PNL FLD: NEGATIVE — SIGNIFICANT CHANGE UP
EGFR: 52 ML/MIN/1.73M2 — LOW
EGFR: 52 ML/MIN/1.73M2 — LOW
EOSINOPHIL # BLD AUTO: 0.03 K/UL — SIGNIFICANT CHANGE UP (ref 0–0.5)
EOSINOPHIL NFR BLD AUTO: 0.6 % — SIGNIFICANT CHANGE UP (ref 0–6)
ETHANOL SERPL-MCNC: <10 MG/DL — SIGNIFICANT CHANGE UP (ref 0–9)
FENTANYL UR QL SCN: NEGATIVE — SIGNIFICANT CHANGE UP
GLUCOSE SERPL-MCNC: 113 MG/DL — HIGH (ref 70–99)
GLUCOSE UR QL: NEGATIVE MG/DL — SIGNIFICANT CHANGE UP
HCT VFR BLD CALC: 35.1 % — SIGNIFICANT CHANGE UP (ref 34.5–45)
HGB BLD-MCNC: 11.6 G/DL — SIGNIFICANT CHANGE UP (ref 11.5–15.5)
IMM GRANULOCYTES # BLD AUTO: 0.03 K/UL — SIGNIFICANT CHANGE UP (ref 0–0.07)
IMM GRANULOCYTES NFR BLD AUTO: 0.6 % — SIGNIFICANT CHANGE UP (ref 0–0.9)
KETONES UR QL: NEGATIVE MG/DL — SIGNIFICANT CHANGE UP
LEUKOCYTE ESTERASE UR-ACNC: NEGATIVE — SIGNIFICANT CHANGE UP
LYMPHOCYTES # BLD AUTO: 0.89 K/UL — LOW (ref 1–3.3)
LYMPHOCYTES NFR BLD AUTO: 18.7 % — SIGNIFICANT CHANGE UP (ref 13–44)
MCHC RBC-ENTMCNC: 31.4 PG — SIGNIFICANT CHANGE UP (ref 27–34)
MCHC RBC-ENTMCNC: 33 G/DL — SIGNIFICANT CHANGE UP (ref 32–36)
MCV RBC AUTO: 95.1 FL — SIGNIFICANT CHANGE UP (ref 80–100)
METHADONE UR-MCNC: NEGATIVE — SIGNIFICANT CHANGE UP
MONOCYTES # BLD AUTO: 0.5 K/UL — SIGNIFICANT CHANGE UP (ref 0–0.9)
MONOCYTES NFR BLD AUTO: 10.5 % — SIGNIFICANT CHANGE UP (ref 2–14)
NEUTROPHILS # BLD AUTO: 3.26 K/UL — SIGNIFICANT CHANGE UP (ref 1.8–7.4)
NEUTROPHILS NFR BLD AUTO: 68.5 % — SIGNIFICANT CHANGE UP (ref 43–77)
NITRITE UR-MCNC: NEGATIVE — SIGNIFICANT CHANGE UP
NRBC # BLD AUTO: 0 K/UL — SIGNIFICANT CHANGE UP (ref 0–0)
NRBC # FLD: 0 K/UL — SIGNIFICANT CHANGE UP (ref 0–0)
NRBC BLD AUTO-RTO: 0 /100 WBCS — SIGNIFICANT CHANGE UP (ref 0–0)
OPIATES UR-MCNC: NEGATIVE — SIGNIFICANT CHANGE UP
PCP SPEC-MCNC: SIGNIFICANT CHANGE UP
PCP UR-MCNC: NEGATIVE — SIGNIFICANT CHANGE UP
PH UR: 8 — SIGNIFICANT CHANGE UP (ref 5–8)
PLATELET # BLD AUTO: 186 K/UL — SIGNIFICANT CHANGE UP (ref 150–400)
PMV BLD: 10.4 FL — SIGNIFICANT CHANGE UP (ref 7–13)
POTASSIUM SERPL-MCNC: 3.2 MMOL/L — LOW (ref 3.5–5.3)
POTASSIUM SERPL-SCNC: 3.2 MMOL/L — LOW (ref 3.5–5.3)
PROT SERPL-MCNC: 6.8 G/DL — SIGNIFICANT CHANGE UP (ref 6.6–8.7)
PROT UR-MCNC: 100 MG/DL
RAPID RVP RESULT: SIGNIFICANT CHANGE UP
RBC # BLD: 3.69 M/UL — LOW (ref 3.8–5.2)
RBC # FLD: 13.7 % — SIGNIFICANT CHANGE UP (ref 10.3–14.5)
RBC CASTS # UR COMP ASSIST: 1 /HPF — SIGNIFICANT CHANGE UP (ref 0–4)
SALICYLATES SERPL-MCNC: <0.6 MG/DL — LOW (ref 10–20)
SARS-COV-2 RNA SPEC QL NAA+PROBE: SIGNIFICANT CHANGE UP
SODIUM SERPL-SCNC: 142 MMOL/L — SIGNIFICANT CHANGE UP (ref 135–145)
SP GR SPEC: 1.01 — SIGNIFICANT CHANGE UP (ref 1–1.03)
SQUAMOUS # UR AUTO: 0 /HPF — SIGNIFICANT CHANGE UP (ref 0–5)
THC UR QL: NEGATIVE — SIGNIFICANT CHANGE UP
UROBILINOGEN FLD QL: 1 MG/DL — SIGNIFICANT CHANGE UP (ref 0.2–1)
WBC # BLD: 4.76 K/UL — SIGNIFICANT CHANGE UP (ref 3.8–10.5)
WBC # FLD AUTO: 4.76 K/UL — SIGNIFICANT CHANGE UP (ref 3.8–10.5)
WBC UR QL: 0 /HPF — SIGNIFICANT CHANGE UP (ref 0–5)

## 2025-07-09 PROCEDURE — 99223 1ST HOSP IP/OBS HIGH 75: CPT

## 2025-07-09 RX ORDER — HALOPERIDOL 10 MG/1
5 TABLET ORAL ONCE
Refills: 0 | Status: COMPLETED | OUTPATIENT
Start: 2025-07-09 | End: 2025-07-09

## 2025-07-09 RX ORDER — OLANZAPINE 10 MG/1
5 TABLET ORAL AT BEDTIME
Refills: 0 | Status: DISCONTINUED | OUTPATIENT
Start: 2025-07-09 | End: 2025-07-15

## 2025-07-09 RX ORDER — LORAZEPAM 4 MG/ML
0.5 VIAL (ML) INJECTION ONCE
Refills: 0 | Status: DISCONTINUED | OUTPATIENT
Start: 2025-07-09 | End: 2025-07-09

## 2025-07-09 RX ORDER — OLANZAPINE 10 MG/1
5 TABLET ORAL ONCE
Refills: 0 | Status: COMPLETED | OUTPATIENT
Start: 2025-07-09 | End: 2025-07-09

## 2025-07-09 RX ADMIN — OLANZAPINE 5 MILLIGRAM(S): 10 TABLET ORAL at 07:00

## 2025-07-09 RX ADMIN — Medication 0.5 MILLIGRAM(S): at 07:49

## 2025-07-09 RX ADMIN — HALOPERIDOL 5 MILLIGRAM(S): 10 TABLET ORAL at 07:16
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
## 2025-07-09 NOTE — ED CDU PROVIDER INITIAL DAY NOTE - CLINICAL SUMMARY MEDICAL DECISION MAKING FREE TEXT BOX
86yo F with dementia and unknown medical issues presents to the ED following an argument with family. Patient is Mohawk speaker, and interviewed via in-person . Patient unable to explain why she is here. Based on triage note, patient was in argument and Patient was left in the ED. Patient is oriented to self only.    patient cleared by psych, will need 5mg Zyprexa nightly. patient agreeable. SW looking for placement for MICHAEL for patient.    will obs until placemnt

## 2025-07-09 NOTE — PHYSICAL THERAPY INITIAL EVALUATION ADULT - GENERAL OBSERVATIONS, REHAB EVAL
awake on stretcher on room air,+telemonitor with , +primafit. Armenian speaking gualberto CLAROS, present and assisted with communication

## 2025-07-09 NOTE — ED ADULT NURSE REASSESSMENT NOTE - NS ED NURSE REASSESS COMMENT FT1
Received report from JESUS MEEK and assumed care of pt. Pt awakens to verbal stimuli, breathing even and unlabored, cm and  in place, NSR. safety sit in place.

## 2025-07-09 NOTE — ED ADULT NURSE REASSESSMENT NOTE - NS ED NURSE REASSESS COMMENT FT1
Pt's Daughter (Seema Ryan, [phone]) expresses wishes to become Pt's healthcare proxy. Pt unable to sign paperwork at this time. HCP form given to pt's daughter. Paperwork to be filled out at later time.

## 2025-07-09 NOTE — ED BEHAVIORAL HEALTH ASSESSMENT NOTE - SUMMARY
85 year old woman domiciled non caregiver no formal psychiatric history (no mental health diagnoses no prior inpatient psychiatric hospitalizations no outpatient mental health follow up no prior suicide attempts or non suicidal self injurious behavior no instances of violence aggression violation of orders of protection; no substance abuse or history of complicated withdrawal or symptoms of severe withdrawal eg seizures delirium tremens) carries diagnosis of dementia per chart who was brought in by ems activated by ?family for behavioral issues.  presenting with behavior issues in setting of established neurocognitive issues. during evaluation presenting appropriate though not fully oriented. suspect a sundown/delirium component superimposed on an unspecified neurocognitive condition. would benefit from a low dose mood stabilizer/antipsychotic for sleep aid/agitation. given she has tolerated olanzapine im, can consider starting standing olanzapine 5 mg po nightly. 85 year old woman domiciled non caregiver no formal psychiatric history (no mental health diagnoses no prior inpatient psychiatric hospitalizations no outpatient mental health follow up no prior suicide attempts or non suicidal self injurious behavior no instances of violence aggression violation of orders of protection; no substance abuse or history of complicated withdrawal or symptoms of severe withdrawal eg seizures delirium tremens) carries diagnosis of dementia per chart who was brought in by ems activated by ?family for behavioral issues.  presenting with behavior issues in setting of established neurocognitive issues. during evaluation presenting appropriate though not fully oriented. suspect a sundown/delirium component superimposed on an unspecified neurocognitive condition. would benefit from a low dose mood stabilizer/antipsychotic for sleep aid/agitation. given she has tolerated olanzapine im, can consider starting standing olanzapine 5 mg po nightly.  benefit of behavioral stabilization/sleep aid outweighs potential risks of adrs at this time especially given the level of agitation without appropriate measures in place evidenced earlier during this hospital encounter.

## 2025-07-09 NOTE — ED ADULT TRIAGE NOTE - CHIEF COMPLAINT QUOTE
pt comes in after a verbal disagreement at home and getting combative with family, pt is axox1 with no other complaints at this time

## 2025-07-09 NOTE — PHYSICAL THERAPY INITIAL EVALUATION ADULT - GROSSLY INTACT, SENSORY
formal assessment limited by cognition. pt responds to tactile stim all extremities./Left UE/Right UE/Left LE/Right LE/Grossly Intact

## 2025-07-09 NOTE — ED BEHAVIORAL HEALTH ASSESSMENT NOTE - DESCRIPTION
Vital Signs Last 24 Hrs  T(C): 36.6 (09 Jul 2025 16:14), Max: 36.6 (09 Jul 2025 02:13)  T(F): 97.9 (09 Jul 2025 16:14), Max: 97.9 (09 Jul 2025 02:13)  HR: 68 (09 Jul 2025 16:14) (67 - 95)  BP: 173/84 (09 Jul 2025 16:14) (169/112 - 179/93)  BP(mean): 114 (09 Jul 2025 16:14) (114 - 114)  RR: 18 (09 Jul 2025 16:14) (16 - 18)  SpO2: 100% (09 Jul 2025 16:14) (96% - 100%)    Parameters below as of 09 Jul 2025 16:14  Patient On (Oxygen Delivery Method): room air h/o dementia domiciled non caregiver

## 2025-07-09 NOTE — PROVIDER CONTACT NOTE (OTHER) - BACKGROUND
84yo F with dementia and unknown medical issues presents to the ED following an argument with family.

## 2025-07-09 NOTE — ED PROVIDER NOTE - ATTENDING CONTRIBUTION TO CARE
85-year-old female past medical history dementia, unknown other past medical history presents to the ED following argument with family.  ED  Chad utilized.  Patient unable to explain why she is here, based on triage note patient had argument with family on was left in ED.  Oriented to self only ANO x 1 otherwise physical exam as above.  Patient became very agitated walking around the ED team, initially given a safety sitter patient subsequently began becoming combative bit a staff member.  Zyprexa ordered.  Plan for labs UA and CT head at this point.  Patient still combative despite Zyprexa, Haldol ordered.  Despite relative contra indication with atypical antipsychotics and benzos, patient was still combative requiring 4 security guards and one-on-one nursing.  Patient unable to be redirected.  Therefore 0.5 mg of IV Ativan administered to patient improved and calm down but remained awake and alert.  Patient signed out to Dr. Garcia pending labs and CT and psych clearance
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
## 2025-07-09 NOTE — PROVIDER CONTACT NOTE (OTHER) - ACTION/TREATMENT ORDERED:
goals(5 days): 1. supervision bed mobility, 2. supervision transfers with RW, 3. supervision amb 50' with RW.    plan: mobility training, balance training

## 2025-07-09 NOTE — ED CDU PROVIDER INITIAL DAY NOTE - OBJECTIVE STATEMENT
86yo F with dementia and unknown medical issues presents to the ED following an argument with family. Patient is Sri Lankan speaker, and interviewed via in-person . Patient unable to explain why she is here. Based on triage note, patient was in argument and Patient was left in the ED. Patient is oriented to self only.
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
## 2025-07-09 NOTE — PROVIDER CONTACT NOTE (OTHER) - ASSESSMENT
pt ok for PT per nurselisa. pt awake on stretcher on room air, +telemonitor with , +primafit. Liberian speaking NA, gualberto, present and assisted with communication. AAOx2. pt required assist for all mobility. see eval for details. pt returned to stretcher. left in nad w/all lines intact, all needs attended to by NA at bedside. will follow.

## 2025-07-09 NOTE — ED PROVIDER NOTE - OBJECTIVE STATEMENT
86yo F with dementia and unknown medical issues presents to the ED following an argument with family. Patient is Marshallese speaker, and interviewed via in-person . Patient unable to explain why she is here. Based on triage note, patient was in argument and Patient was left in the ED. Patient is oriented to self only. No
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
## 2025-07-09 NOTE — ED PROVIDER NOTE - PHYSICAL EXAMINATION
Gen: well appearing, no acute distress  Head: normocephalic, atraumatic  EENT: EOMI, moist mucous membranes, no scleral icterus  Lung: no increased work of breathing, clear to auscultation bilaterally, no wheezing, rales, rhonchi, speaking in full sentences in Icelandic  CV: regular rate, regular rhythm, normal s1/s2, 2+ radial pulses bilaterally  Abd: soft, non-tender, non-distended,    MSK: No edema, no visible deformities, full range of motion in all 4 extremities  Neuro: AOx1, no focal neurologic deficits  Skin: No obvious rash, no jaundice Gen: well appearing, no acute distress, patient ambulating to bathroom.   Head: normocephalic, atraumatic  EENT: EOMI, moist mucous membranes, no scleral icterus  Lung: no increased work of breathing, clear to auscultation bilaterally, no wheezing, rales, rhonchi, speaking in full sentences in Albanian  CV: regular rate, regular rhythm, normal s1/s2, 2+ radial pulses bilaterally  Abd: soft, non-tender, non-distended,    MSK: No edema, no visible deformities, full range of motion in all 4 extremities  Neuro: AOx1, no focal neurologic deficits  Skin: No obvious rash, no jaundice

## 2025-07-09 NOTE — ED CDU PROVIDER INITIAL DAY NOTE - PHYSICAL EXAMINATION
General: well appearing, no acute distress, appropriate weight. ambulating with ease.   HENT:  Head: normocephalic, atraumatic.   Eyes: EOMI, PERRLA, no nystagmus  Nose: atraumatic  Oropharynx: mucosa pink, moist, no lesions, uvula midline.  Cardiac: heart RRR, no murmurs, rubs, gallops, pulses 2+ x4  Pulm: lungs CTA  GI: abdomen soft, nontender  Neuro: A&Ox1 - self, sensation intact, strength 5/5  Skin: warm, dry, no rash, laceration, abrasion, contusion

## 2025-07-09 NOTE — PHYSICAL THERAPY INITIAL EVALUATION ADULT - PERTINENT HX OF CURRENT PROBLEM, REHAB EVAL
84yo F with dementia and unknown medical issues presents to the ED following an argument with family. Patient is Comoran speaker, and interviewed via in-person . Patient unable to explain why she is here. Based on triage note, patient was in argument and Patient was left in the ED.

## 2025-07-09 NOTE — ED ADULT NURSE NOTE - NSFALLHARMRISKINTERV_ED_ALL_ED
Called Pt's Nephrologist, Dr. Aashish Banks, [phone].  He was unavailbe and transferred over to his nurse, Mariya and  left message about Pt's elevated BP, 154/82; recheck 174/100, and Lisinopril med.   Not sure if Pt is supposed to be taking this med.  Pt endorses he is not taking.  Requesting  Dr. Banks to be main provider managing Pt's HTN, unless otherwise.  Call back to clinic if any concerns.    Communicate risk of Fall with Harm to all staff, patient, and family/Provide visual cue: red socks, yellow wristband, yellow gown, etc/Reinforce activity limits and safety measures with patient and family/Bed in lowest position, wheels locked, appropriate side rails in place/Call bell, personal items and telephone in reach/Instruct patient to call for assistance before getting out of bed/chair/stretcher/Non-slip footwear applied when patient is off stretcher/[place] to call system/Physically safe environment - no spills, clutter or unnecessary equipment/Purposeful Proactive Rounding/Room/bathroom lighting operational, light cord in reach

## 2025-07-09 NOTE — ED BEHAVIORAL HEALTH ASSESSMENT NOTE - DETAILS
as per above spoke with daughter Advised patient to go to nearest ER or call 911, for any of the followin) agitation aggression or anxiety, 2) suicidal or homicidal thoughts 3) worsening of symptoms or 4) side effects of medications

## 2025-07-10 PROCEDURE — 99232 SBSQ HOSP IP/OBS MODERATE 35: CPT

## 2025-07-10 RX ORDER — AMLODIPINE BESYLATE 10 MG/1
5 TABLET ORAL DAILY
Refills: 0 | Status: DISCONTINUED | OUTPATIENT
Start: 2025-07-10 | End: 2025-07-15

## 2025-07-10 RX ORDER — AMLODIPINE BESYLATE 10 MG/1
5 TABLET ORAL ONCE
Refills: 0 | Status: COMPLETED | OUTPATIENT
Start: 2025-07-10 | End: 2025-07-10

## 2025-07-10 RX ADMIN — OLANZAPINE 5 MILLIGRAM(S): 10 TABLET ORAL at 22:16

## 2025-07-10 RX ADMIN — AMLODIPINE BESYLATE 5 MILLIGRAM(S): 10 TABLET ORAL at 19:02

## 2025-07-10 RX ADMIN — OLANZAPINE 5 MILLIGRAM(S): 10 TABLET ORAL at 00:20

## 2025-07-10 NOTE — ED ADULT NURSE REASSESSMENT NOTE - NS ED NURSE REASSESS COMMENT FT1
pt resting comfortably, safety sit at bedside, pt baseline mental status, denies complaints, no increased WOB, no acute distress pending [facility] placement in AM.

## 2025-07-10 NOTE — ED ADULT NURSE REASSESSMENT NOTE - NS ED NURSE REASSESS COMMENT FT1
rec pt. from day shift. pt. is aaox1, primarily Nepalese speaking. behavior is calm, cooperative. taking pills without a problem. no acute resp distress noted. safety maintained. safety sitter at bedside.

## 2025-07-10 NOTE — ED ADULT NURSE REASSESSMENT NOTE - NS ED NURSE REASSESS COMMENT FT1
Assumed care of pt at this time. Patient in no acute distress, resp nonlabored, resting comfortably in bed with SNA safety sit at bedside.  Pt offering no complaints at this time. pt remains in yellow gown. Patient awaiting placement by SW. Pt remains on continuos cardiac monitoring, HR 65,  100% RA Safety maintained.

## 2025-07-10 NOTE — ED ADULT NURSE REASSESSMENT NOTE - NS ED NURSE REASSESS COMMENT FT1
pt requiring safety sit back due to patients impulsivity of climbing oob. remains in yellow and SNA at bedside

## 2025-07-10 NOTE — ED ADULT NURSE REASSESSMENT NOTE - NS ED NURSE REASSESS COMMENT FT1
pt remains at baseline mental status, resting comfortable in bed. Pt remains on continuos cardiac monitoring. Pt remains in yellow gown with SNA safety sit at bedside

## 2025-07-11 LAB
CULTURE RESULTS: SIGNIFICANT CHANGE UP
SPECIMEN SOURCE: SIGNIFICANT CHANGE UP

## 2025-07-11 PROCEDURE — 99231 SBSQ HOSP IP/OBS SF/LOW 25: CPT

## 2025-07-11 RX ADMIN — AMLODIPINE BESYLATE 5 MILLIGRAM(S): 10 TABLET ORAL at 05:35

## 2025-07-11 RX ADMIN — OLANZAPINE 5 MILLIGRAM(S): 10 TABLET ORAL at 21:31

## 2025-07-11 NOTE — ED ADULT NURSE REASSESSMENT NOTE - NS ED NURSE REASSESS COMMENT FT1
Pt care assumed @ this time. Pt resting in bed. VSS. Resp even and unlabored. Awaiting SW at this time. Pt updated on plan of care and verbalizes understanding. Yellow gown on, safety sit in place

## 2025-07-11 NOTE — ED ADULT NURSE REASSESSMENT NOTE - NS ED NURSE REASSESS COMMENT FT1
pt. is sleeping comfortably. no acute resp distress noted. denies pain. safety sitter at bedside. safety maintained.

## 2025-07-11 NOTE — ED CDU PROVIDER SUBSEQUENT DAY NOTE - PHYSICAL EXAMINATION
General: NAD  Head: normocephalic, atraumatic.   Eyes: EOMI, PERRLA, no nystagmus  Nose: atraumatic  Oropharynx: mucosa pink, moist, no lesions, uvula midline.  Cardiac: heart RRR, no murmurs, rubs, gallops, pulses 2+ x4  Pulm: lungs CTA  GI: abdomen soft, nontender  Neuro: A&Ox1 - self, sensation intact, strength 5/5  Skin: warm, dry, no rash, laceration, abrasion, contusion
General: NAD  Head: normocephalic, atraumatic.   Eyes: EOMI, PERRLA, no nystagmus  Nose: atraumatic  Oropharynx: mucosa pink, moist, no lesions, uvula midline.  Cardiac: heart RRR, no murmurs, rubs, gallops, pulses 2+ x4  Pulm: lungs CTA  GI: abdomen soft, nontender  Neuro: A&Ox1 - self, sensation intact, strength 5/5  Skin: warm, dry, no rash, laceration, abrasion, contusion

## 2025-07-12 PROCEDURE — 99222 1ST HOSP IP/OBS MODERATE 55: CPT

## 2025-07-12 PROCEDURE — 99233 SBSQ HOSP IP/OBS HIGH 50: CPT

## 2025-07-12 RX ORDER — LORAZEPAM 4 MG/ML
0.5 VIAL (ML) INJECTION ONCE
Refills: 0 | Status: DISCONTINUED | OUTPATIENT
Start: 2025-07-12 | End: 2025-07-12

## 2025-07-12 RX ORDER — OLANZAPINE 10 MG/1
5 TABLET ORAL ONCE
Refills: 0 | Status: COMPLETED | OUTPATIENT
Start: 2025-07-12 | End: 2025-07-12

## 2025-07-12 RX ADMIN — Medication 0.5 MILLIGRAM(S): at 05:16

## 2025-07-12 RX ADMIN — OLANZAPINE 5 MILLIGRAM(S): 10 TABLET ORAL at 21:28

## 2025-07-12 RX ADMIN — OLANZAPINE 5 MILLIGRAM(S): 10 TABLET ORAL at 06:00
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
## 2025-07-12 NOTE — ED CDU PROVIDER SUBSEQUENT DAY NOTE - HISTORY
NAEO; in observation for [facility] placement.
PAM, [facility] possibly accepted [facility], pending SW in AM
No acute events overnight and awaiting [facility] placement

## 2025-07-12 NOTE — ED ADULT NURSE REASSESSMENT NOTE - NS ED NURSE REASSESS COMMENT FT1
pt received at change of shift history of dementia , pt on safety watch pt trying to get out of bed , respirations even and unlabored will continue to monitor

## 2025-07-12 NOTE — H&P ADULT - HISTORY OF PRESENT ILLNESS
85F with a history of dementia who presented to the hospital following an argument wit her family and becoming combative. After initial evaluation, the patient was placed under observational status in the emergency department. She was evaluated by Physical Therapy and thought to benefit from treatment at a rehabilitation facility. The patient was also evaluated by Psychiatry and olanzapine was initiated. The patient remained in the emergency department while awaiting placement without any events. Admission was requested while awaiting treatment at a rehabilitation facility.      PMH: Dementia  PSH: None reported  Family History: Unable to obtain

## 2025-07-12 NOTE — ED CDU PROVIDER SUBSEQUENT DAY NOTE - CLINICAL SUMMARY MEDICAL DECISION MAKING FREE TEXT BOX
85F hx dementia, AO1, per sign out patient is pending placement in [facility] possibly accepted to [facility] following, patient medically cleared by prior team, will continue to follow for poss dispo planning.
85F hx dementia, AO1, per sign out patient is pending placement in [facility], cleared by psychiatry, SW following, will follow up recs, patient medically cleared by prior team, will continue to follow for poss dispo planning.
pt medically cleared and pending [facility] placement

## 2025-07-12 NOTE — H&P ADULT - NSHPPHYSICALEXAM_GEN_ALL_CORE
Vital Signs Last 24 Hrs  T(C): 36.4 (12 Jul 2025 07:37), Max: 36.7 (11 Jul 2025 19:07)  T(F): 97.6 (12 Jul 2025 07:37), Max: 98 (11 Jul 2025 19:07)  HR: 73 (12 Jul 2025 14:08) (71 - 92)  BP: 147/72 (12 Jul 2025 14:08) (130/79 - 154/82)  BP(mean): --  RR: 18 (12 Jul 2025 14:08) (18 - 18)  SpO2: 97% (12 Jul 2025 14:08) (95% - 99%)    Parameters below as of 12 Jul 2025 14:08  Patient On (Oxygen Delivery Method): room air    General appearance: No acute distress, Somnolent, Opens eyes briefly but did not wish to speak  HEENT: Normocephalic, Atraumatic, Conjunctiva clear  Neck: Supple, No JVD, No tenderness  Lungs: Breath sound equal bilaterally, No wheezes, No rales  Cardiovascular: S1S2, Regular rhythm  Abdomen: Soft, Nontender, Nondistended, No guarding/rebound, Positive bowel sounds  Extremities: No clubbing, No cyanosis, No edema, No calf tenderness

## 2025-07-12 NOTE — ED CDU PROVIDER SUBSEQUENT DAY NOTE - CARDIOVASCULAR NEGATIVE STATEMENT, MLM
Date of Service: 01/03/2018    The patient seen in clinic on 01/03/2018, chart is reviewed.    CHIEF COMPLAINT:  \"I feel depressed and feel anxious.\"    HISTORY OF PRESENT ILLNESS:  The patient is off of Lamictal.  The patient started taking a smaller dose of Lamictal, 5-10 mg, daily.  The patient complains of leg swelling and knee pain.  She did not experience any skin rash, but does not want to take Lamictal anymore.    Current medications reviewed.      The patient is accompanied by her .  Denies any suicidal or homicidal thought.  Denies any psychotic symptoms or paranoia.  Trying to be active.    Denies any other side effects from medication.    ASSESSMENT:  Mood disorder, not otherwise specified.    RECOMMENDATIONS:  1.  Continue current medication without any change.  Discontinue Lamictal.  2.  Discussed about medications like antipsychotics as a mood stabilizers like Abilify or Geodon.  Given information on both of these medications and patient agreed to call me back in a few days after deciding on medication.  Followup appointment in a few weeks.      Dictated By: Ania Myers MD  Signing Provider: MD ERWIN Vera/maribell ([id number])  DD: 01/09/2018 21:39:44 TD: 01/11/2018 03:01:24    Copy Sent To:    no chest pain and no edema.

## 2025-07-13 LAB
ANION GAP SERPL CALC-SCNC: 15 MMOL/L — SIGNIFICANT CHANGE UP (ref 5–17)
BUN SERPL-MCNC: 15.8 MG/DL — SIGNIFICANT CHANGE UP (ref 8–20)
CALCIUM SERPL-MCNC: 8.8 MG/DL — SIGNIFICANT CHANGE UP (ref 8.4–10.5)
CHLORIDE SERPL-SCNC: 104 MMOL/L — SIGNIFICANT CHANGE UP (ref 96–108)
CO2 SERPL-SCNC: 21 MMOL/L — LOW (ref 22–29)
CREAT SERPL-MCNC: 1.1 MG/DL — SIGNIFICANT CHANGE UP (ref 0.5–1.3)
EGFR: 49 ML/MIN/1.73M2 — LOW
EGFR: 49 ML/MIN/1.73M2 — LOW
GLUCOSE SERPL-MCNC: 79 MG/DL — SIGNIFICANT CHANGE UP (ref 70–99)
MAGNESIUM SERPL-MCNC: 2.3 MG/DL — SIGNIFICANT CHANGE UP (ref 1.6–2.6)
POTASSIUM SERPL-MCNC: 3.6 MMOL/L — SIGNIFICANT CHANGE UP (ref 3.5–5.3)
POTASSIUM SERPL-SCNC: 3.6 MMOL/L — SIGNIFICANT CHANGE UP (ref 3.5–5.3)
SODIUM SERPL-SCNC: 140 MMOL/L — SIGNIFICANT CHANGE UP (ref 135–145)

## 2025-07-13 RX ORDER — AMLODIPINE BESYLATE 10 MG/1
1 TABLET ORAL
Qty: 0 | Refills: 0 | DISCHARGE
Start: 2025-07-13

## 2025-07-13 RX ORDER — ENOXAPARIN SODIUM 100 MG/ML
40 INJECTION SUBCUTANEOUS EVERY 24 HOURS
Refills: 0 | Status: DISCONTINUED | OUTPATIENT
Start: 2025-07-13 | End: 2025-07-15

## 2025-07-13 RX ORDER — OLANZAPINE 10 MG/1
1 TABLET ORAL
Qty: 0 | Refills: 0 | DISCHARGE
Start: 2025-07-13

## 2025-07-13 RX ADMIN — AMLODIPINE BESYLATE 5 MILLIGRAM(S): 10 TABLET ORAL at 05:30

## 2025-07-13 RX ADMIN — OLANZAPINE 5 MILLIGRAM(S): 10 TABLET ORAL at 22:26
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
## 2025-07-13 NOTE — DISCHARGE NOTE PROVIDER - NSDCMRMEDTOKEN_GEN_ALL_CORE_FT
amLODIPine 5 mg oral tablet: 1 tab(s) orally once a day  OLANZapine 5 mg oral tablet: 1 tab(s) orally once a day (at bedtime)   amLODIPine 5 mg oral tablet: 1 tab(s) orally once a day  amLODIPine 5 mg oral tablet: 1 tab(s) orally once a day  OLANZapine 5 mg oral tablet: 1 tab(s) orally once a day (at bedtime)  OLANZapine 5 mg oral tablet: 1 tab(s) orally once a day (at bedtime)   Adult Aspirin Regimen 81 mg oral delayed release tablet: 1 tab(s) orally once a day  amLODIPine 5 mg oral tablet: 1 tab(s) orally once a day  Brilinta (ticagrelor) 90 mg oral tablet: 1 tab(s) orally 2 times a day  Cardiac Rehab: Cardiac Rehab for dx CAD s/p stent placement: 3 days/week x 12 weeks     Cardiologist: Dr. Shankar Berry 50 mg oral tablet: 1 tab(s) orally once a day  Crestor 20 mg oral tablet: 1 tab(s) orally once a day  OLANZapine 5 mg oral tablet: 1 tab(s) orally once a day (at bedtime)  Synthroid 125 mcg (0.125 mg) oral tablet: 1 tab(s) orally once a day  Toprol-XL 50 mg oral tablet, extended release: 1 tab(s) orally once a day

## 2025-07-13 NOTE — DISCHARGE NOTE PROVIDER - HOSPITAL COURSE
HPI: 85F with a history of dementia who presented to the hospital following an argument with her family and becoming combative.     Hospital Course: After initial evaluation, the patient was placed under observational status in the emergency department. She was evaluated by Physical Therapy and thought to benefit from treatment at a rehabilitation facility. The patient was also evaluated by Psychiatry and olanzapine was initiated. The patient remained in the emergency department while awaiting placement without any events. Admission was requested while awaiting treatment at a rehabilitation facility. CT Head negative for acute intracranial pathology. Urinalysis and Urine toxicology negative. Hypokalemic on initial presentation, repeat wnl.    Important Medication Changes and Reason:    - Seen by psychiatry and started on olanzapine 5mg at bedtime     Continue all other medications as seen in medication reconciliation.    Active or Pending Issues Requiring Follow-up:    - Please follow up with a neurologist for further workup of dementia    Advanced Directives:     [X] Full code/[ ] DNR /[ ]Hospice    Discharge Diagnoses:  - Dementia  - Hypokalemia       HPI: 85F with a history of dementia who presented to the hospital following an argument with her family and becoming combative.     Hospital Course: After initial evaluation, the patient was placed under observational status in the emergency department. She was evaluated by Physical Therapy and thought to benefit from treatment at a rehabilitation facility. The patient was also evaluated by Psychiatry and olanzapine was initiated. The patient remained in the emergency department while awaiting placement without any events. Admission was requested while awaiting treatment at a rehabilitation facility. CT Head negative for acute intracranial pathology. Urinalysis and Urine toxicology negative. Hypokalemic on initial presentation, repeat wnl.    Important Medication Changes and Reason:    - Seen by psychiatry and started on olanzapine 5mg at bedtime     Continue all other medications as seen in medication reconciliation.    Active or Pending Issues Requiring Follow-up:    - Please follow up with a neurologist for further workup of dementia  - Patient requires 24/7 care at home    Advanced Directives:     [X] Full code/[ ] DNR /[ ]Hospice    Discharge Diagnoses:  - Dementia  - Hypokalemia

## 2025-07-13 NOTE — PROGRESS NOTE ADULT - SUBJECTIVE AND OBJECTIVE BOX
Patient is a 85y old  Female who presents with a chief complaint of aggressive behavior    SUBJECTIVE:    BRIEF HOSPITAL COURSE: 85F with a history of dementia who presented to the hospital following an argument wit her family and becoming combative. After initial evaluation, the patient was placed under observational status in the emergency department. She was evaluated by Physical Therapy and thought to benefit from treatment at a rehabilitation facility. The patient was also evaluated by Psychiatry and olanzapine was initiated. The patient remained in the emergency department while awaiting placement without any events. Admission was requested while awaiting treatment at a rehabilitation facility. CT Head negative for acute intracranial pathology. Urinalysis and Urine toxicology negative. Hypokalemic on initial presentation, repeat wnl.    OVERNIGHT EVENTS/INTERVAL HPI: Today is hospital day 1. Patient seen and examined at bedside. Overnight, patient was found to be hypothermic and hypertensive, placed on veena huggers. Temperature has been wnl since then. This morning patient was seen resting comfortably. Opened her eyes but did not want to speak.    MEDICATIONS  (STANDING):  amLODIPine   Tablet 5 milliGRAM(s) Oral daily  enoxaparin Injectable 40 milliGRAM(s) SubCutaneous every 24 hours  OLANZapine 5 milliGRAM(s) Oral at bedtime    MEDICATIONS  (PRN):      Allergies    No Known Allergies    Intolerances        REVIEW OF SYSTEMS: Review of systems is otherwise negative unless stated in the HPI above.    OBJECTIVE:  Vital Signs Last 24 Hrs  T(C): 36.6 (13 Jul 2025 05:04), Max: 36.6 (13 Jul 2025 05:04)  T(F): 97.8 (13 Jul 2025 05:04), Max: 97.8 (13 Jul 2025 05:04)  HR: 75 (13 Jul 2025 05:04) (63 - 75)  BP: 124/74 (13 Jul 2025 05:04) (124/74 - 171/74)  BP(mean): --  RR: 18 (13 Jul 2025 05:04) (18 - 18)  SpO2: 95% (13 Jul 2025 05:04) (93% - 98%)    Parameters below as of 13 Jul 2025 05:04  Patient On (Oxygen Delivery Method): room air        PHYSICAL EXAM:  GENERAL: no acute distress, comfortably in bed, did not wish to speak  HEENT: Atraumatic, normocephalic, non-icteric, no JVD  NEURO: No focal deficits, moving all extremities spontaneously, A&Ox3, no dysarthria, CN II-XII grossly intact  PSYCH: Normal affect, calm, appropriate insight and judgment, fluent speech  LUNGS: CTAB, no wrr, non-labored breathing  HEART: RRR, no murmur appreciated  ABD: Soft, non-tender, non-distended, no organomegaly, no appreciable masses, +bs all 4 quadrants  EXTREMITIES: Nontender, no clubbing, cyanosis, or edema  SKIN: No rashes or lesions      LABS:    07-13    140  |  104  |  15.8  ----------------------------<  79  3.6   |  21.0[L]  |  1.10    Ca    8.8      13 Jul 2025 04:55  Mg     2.3     07-13        Urinalysis Basic - ( 13 Jul 2025 04:55 )    Color: x / Appearance: x / SG: x / pH: x  Gluc: 79 mg/dL / Ketone: x  / Bili: x / Urobili: x   Blood: x / Protein: x / Nitrite: x   Leuk Esterase: x / RBC: x / WBC x   Sq Epi: x / Non Sq Epi: x / Bacteria: x      CAPILLARY BLOOD GLUCOSE          RADIOLOGY & ADDITIONAL TESTS:

## 2025-07-13 NOTE — DISCHARGE NOTE PROVIDER - ATTENDING DISCHARGE PHYSICAL EXAMINATION:
GENERAL: no acute distress, comfortably in bed  HEENT: Atraumatic, normocephalic, non-icteric, no JVD  NEURO: No focal deficits, moving all extremities spontaneously, A&Ox3, no dysarthria, CN II-XII grossly intact  PSYCH: Normal affect, calm, appropriate insight and judgment, fluent speech  LUNGS: CTAB, no wrr, non-labored breathing  HEART: RRR, no murmur appreciated  ABD: Soft, non-tender, non-distended, no organomegaly, no appreciable masses, +bs all 4 quadrants  EXTREMITIES: Nontender, no clubbing, cyanosis, or edema  SKIN: No rashes or lesions

## 2025-07-13 NOTE — DISCHARGE NOTE PROVIDER - NSCORESITESY/N_GEN_A_CORE_RD
Portal message sent.    Pt offered apt today to discuss.  Pt is scheduled.  [place] CT from 11/9 pulled through [facility].    Reached out to Teressa at [facility] in [place] at phone [phone] and she does have request out to radiology to push imaging through to [facility] for CT from today 11/9/23.  Will request images be pushed through and review with Dr. Stover next week.  It does appear patient also has UC in process.    Yes, all of those conservative measures should be good. Heat to the bladder help relax the bladder.   No IV intact
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
## 2025-07-13 NOTE — CHART NOTE - NSCHARTNOTESELECT_GEN_ALL_CORE
Social Work/Event Note
Collateral Note
Event Note
Social Work Note:/Event Note
Social Work Note:/Event Note

## 2025-07-13 NOTE — DISCHARGE NOTE PROVIDER - NSDCFUADDAPPT_GEN_ALL_CORE_FT
APPTS ARE READY TO BE MADE: [X] YES    Additional Information about above appointments (if needed):    1: Please follow up with your primary care provider  2: Please follow up with a neurologist for management of dementia     APPTS ARE READY TO BE MADE: [X] YES    Additional Information about above appointments (if needed):    1: Please follow up with your primary care provider  2: Please follow up with a neurologist for management of dementia    Patient was outreached but did not answer nor could a voicemail be left.

## 2025-07-13 NOTE — DISCHARGE NOTE PROVIDER - CARE PROVIDER_API CALL
Josh Loredo  Clinical Neurophysiology  [address] [place] [id number]  Phone: [phone]  Fax: [phone]  Follow Up Time:

## 2025-07-13 NOTE — DISCHARGE NOTE PROVIDER - NSDCCPCAREPLAN_GEN_ALL_CORE_FT
PRINCIPAL DISCHARGE DIAGNOSIS  Diagnosis: Dementia  Assessment and Plan of Treatment: You are known to have a history of dementia, please go see your an outpatient neurologist for further workup.      SECONDARY DISCHARGE DIAGNOSES  Diagnosis: Hypokalemia  Assessment and Plan of Treatment: Your potassium levels were low on admission, but have remained stable since providing potassium. Please follow up with your primary care provider to have repeat labs done.     PRINCIPAL DISCHARGE DIAGNOSIS  Diagnosis: Dementia  Assessment and Plan of Treatment: You are known to have a history of dementia, please go see your an outpatient neurologist for further workup. You are required to have 24/7 care at home      SECONDARY DISCHARGE DIAGNOSES  Diagnosis: Hypokalemia  Assessment and Plan of Treatment: Your potassium levels were low on admission, but have remained stable since providing potassium. Please follow up with your primary care provider to have repeat labs done.

## 2025-07-14 LAB
ANION GAP SERPL CALC-SCNC: 11 MMOL/L — SIGNIFICANT CHANGE UP (ref 5–17)
BUN SERPL-MCNC: 20.4 MG/DL — HIGH (ref 8–20)
CALCIUM SERPL-MCNC: 9.1 MG/DL — SIGNIFICANT CHANGE UP (ref 8.4–10.5)
CHLORIDE SERPL-SCNC: 104 MMOL/L — SIGNIFICANT CHANGE UP (ref 96–108)
CO2 SERPL-SCNC: 23 MMOL/L — SIGNIFICANT CHANGE UP (ref 22–29)
CREAT SERPL-MCNC: 1.06 MG/DL — SIGNIFICANT CHANGE UP (ref 0.5–1.3)
EGFR: 51 ML/MIN/1.73M2 — LOW
EGFR: 51 ML/MIN/1.73M2 — LOW
GLUCOSE SERPL-MCNC: 81 MG/DL — SIGNIFICANT CHANGE UP (ref 70–99)
POTASSIUM SERPL-MCNC: 3.5 MMOL/L — SIGNIFICANT CHANGE UP (ref 3.5–5.3)
POTASSIUM SERPL-SCNC: 3.5 MMOL/L — SIGNIFICANT CHANGE UP (ref 3.5–5.3)
SODIUM SERPL-SCNC: 138 MMOL/L — SIGNIFICANT CHANGE UP (ref 135–145)

## 2025-07-14 RX ADMIN — ENOXAPARIN SODIUM 40 MILLIGRAM(S): 100 INJECTION SUBCUTANEOUS at 06:02

## 2025-07-14 RX ADMIN — AMLODIPINE BESYLATE 5 MILLIGRAM(S): 10 TABLET ORAL at 06:02

## 2025-07-14 RX ADMIN — OLANZAPINE 5 MILLIGRAM(S): 10 TABLET ORAL at 21:09

## 2025-07-14 NOTE — PROGRESS NOTE ADULT - SUBJECTIVE AND OBJECTIVE BOX
Patient is a 85y old  Female who presents with a chief complaint of Aggressive Behavior (13 Jul 2025 15:23)    SUBJECTIVE:    BRIEF HOSPITAL COURSE: 85F with a history of dementia who presented to the hospital following an argument wit her family and becoming combative. After initial evaluation, the patient was placed under observational status in the emergency department. She was evaluated by Physical Therapy and thought to benefit from treatment at a rehabilitation facility. The patient was also evaluated by Psychiatry and olanzapine was initiated. The patient remained in the emergency department while awaiting placement without any events. Admission was requested while awaiting treatment at a rehabilitation facility. CT Head negative for acute intracranial pathology. Urinalysis and Urine toxicology negative. Hypokalemic on initial presentation, repeat wnl.    OVERNIGHT EVENTS/INTERVAL HPI: Today is hospital day 2. No acute events overnight. Patient seen and examined at bedside.     MEDICATIONS  (STANDING):  amLODIPine   Tablet 5 milliGRAM(s) Oral daily  enoxaparin Injectable 40 milliGRAM(s) SubCutaneous every 24 hours  OLANZapine 5 milliGRAM(s) Oral at bedtime    MEDICATIONS  (PRN):      Allergies    No Known Allergies    Intolerances        REVIEW OF SYSTEMS: Review of systems is otherwise negative unless stated in the HPI above.    OBJECTIVE:  Vital Signs Last 24 Hrs  T(C): 36.7 (14 Jul 2025 05:22), Max: 37.1 (13 Jul 2025 14:41)  T(F): 98 (14 Jul 2025 05:22), Max: 98.7 (13 Jul 2025 14:41)  HR: 70 (14 Jul 2025 05:22) (67 - 72)  BP: 144/70 (14 Jul 2025 05:22) (115/68 - 151/80)  BP(mean): --  RR: 17 (14 Jul 2025 05:22) (17 - 18)  SpO2: 95% (14 Jul 2025 05:22) (95% - 97%)    Parameters below as of 14 Jul 2025 05:22  Patient On (Oxygen Delivery Method): room air        PHYSICAL EXAM:    GENERAL: no acute distress, comfortably in bed, did not wish to speak  HEENT: Atraumatic, normocephalic, non-icteric, no JVD  NEURO: No focal deficits, moving all extremities spontaneously, A&Ox3, no dysarthria, CN II-XII grossly intact  PSYCH: Normal affect, calm, appropriate insight and judgment, fluent speech  LUNGS: CTAB, no wrr, non-labored breathing  HEART: RRR, no murmur appreciated  ABD: Soft, non-tender, non-distended, no organomegaly, no appreciable masses, +bs all 4 quadrants  EXTREMITIES: Nontender, no clubbing, cyanosis, or edema  SKIN: No rashes or lesions    LABS:    07-14    138  |  104  |  20.4[H]  ----------------------------<  81  3.5   |  23.0  |  1.06    Ca    9.1      14 Jul 2025 05:00  Mg     2.3     07-13        Urinalysis Basic - ( 14 Jul 2025 05:00 )    Color: x / Appearance: x / SG: x / pH: x  Gluc: 81 mg/dL / Ketone: x  / Bili: x / Urobili: x   Blood: x / Protein: x / Nitrite: x   Leuk Esterase: x / RBC: x / WBC x   Sq Epi: x / Non Sq Epi: x / Bacteria: x      CAPILLARY BLOOD GLUCOSE          RADIOLOGY & ADDITIONAL TESTS:

## 2025-07-14 NOTE — PROGRESS NOTE ADULT - ASSESSMENT
85F with a history of dementia who presented after an argument with her family with aggressive behavior.    Dementia  - CT head was without acute intracranial pathology  - Urinalysis was not indicative of infection  - Urine toxicology was negative  - Behavioral health evaluation noted  - Psych consulted, on olanzapine 5 mg at bed time  - Continue with supportive care  - Physical Therapy evaluation noted, thought to benefit from further treatment at an inpatient rehabilitation facility    Hypokalemia (resolved)  - 3.2 on admission , 3.6 7/13, 3.5 7/14  - Continue to monitor    Disposition: Anticipate discharge to a rehabilitation facility in 1-2 days pending arrangement.

## 2025-07-15 ENCOUNTER — TRANSCRIPTION ENCOUNTER (OUTPATIENT)
Age: 85
End: 2025-07-15

## 2025-07-15 VITALS
DIASTOLIC BLOOD PRESSURE: 74 MMHG | SYSTOLIC BLOOD PRESSURE: 169 MMHG | RESPIRATION RATE: 18 BRPM | HEART RATE: 69 BPM | TEMPERATURE: 97 F | OXYGEN SATURATION: 97 %

## 2025-07-15 LAB
ANION GAP SERPL CALC-SCNC: 11 MMOL/L — SIGNIFICANT CHANGE UP (ref 5–17)
BUN SERPL-MCNC: 20.2 MG/DL — HIGH (ref 8–20)
CALCIUM SERPL-MCNC: 8.7 MG/DL — SIGNIFICANT CHANGE UP (ref 8.4–10.5)
CHLORIDE SERPL-SCNC: 105 MMOL/L — SIGNIFICANT CHANGE UP (ref 96–108)
CO2 SERPL-SCNC: 24 MMOL/L — SIGNIFICANT CHANGE UP (ref 22–29)
CREAT SERPL-MCNC: 1 MG/DL — SIGNIFICANT CHANGE UP (ref 0.5–1.3)
EGFR: 55 ML/MIN/1.73M2 — LOW
EGFR: 55 ML/MIN/1.73M2 — LOW
GLUCOSE SERPL-MCNC: 76 MG/DL — SIGNIFICANT CHANGE UP (ref 70–99)
POTASSIUM SERPL-MCNC: 3.7 MMOL/L — SIGNIFICANT CHANGE UP (ref 3.5–5.3)
POTASSIUM SERPL-SCNC: 3.7 MMOL/L — SIGNIFICANT CHANGE UP (ref 3.5–5.3)
SODIUM SERPL-SCNC: 140 MMOL/L — SIGNIFICANT CHANGE UP (ref 135–145)

## 2025-07-15 PROCEDURE — 83735 ASSAY OF MAGNESIUM: CPT

## 2025-07-15 PROCEDURE — 80053 COMPREHEN METABOLIC PANEL: CPT

## 2025-07-15 PROCEDURE — 80307 DRUG TEST PRSMV CHEM ANLYZR: CPT

## 2025-07-15 PROCEDURE — 93005 ELECTROCARDIOGRAM TRACING: CPT

## 2025-07-15 PROCEDURE — 70450 CT HEAD/BRAIN W/O DYE: CPT

## 2025-07-15 PROCEDURE — G0378: CPT

## 2025-07-15 PROCEDURE — T1013: CPT

## 2025-07-15 PROCEDURE — 85025 COMPLETE CBC W/AUTO DIFF WBC: CPT

## 2025-07-15 PROCEDURE — 81001 URINALYSIS AUTO W/SCOPE: CPT

## 2025-07-15 PROCEDURE — 80048 BASIC METABOLIC PNL TOTAL CA: CPT

## 2025-07-15 PROCEDURE — 87086 URINE CULTURE/COLONY COUNT: CPT

## 2025-07-15 PROCEDURE — 36415 COLL VENOUS BLD VENIPUNCTURE: CPT

## 2025-07-15 PROCEDURE — 0225U NFCT DS DNA&RNA 21 SARSCOV2: CPT

## 2025-07-15 PROCEDURE — 99285 EMERGENCY DEPT VISIT HI MDM: CPT

## 2025-07-15 PROCEDURE — 96372 THER/PROPH/DIAG INJ SC/IM: CPT

## 2025-07-15 RX ORDER — AMLODIPINE BESYLATE 10 MG/1
1 TABLET ORAL
Qty: 30 | Refills: 0
Start: 2025-07-15 | End: 2025-08-13

## 2025-07-15 RX ORDER — OLANZAPINE 10 MG/1
1 TABLET ORAL
Qty: 30 | Refills: 0
Start: 2025-07-15 | End: 2025-08-13

## 2025-07-15 RX ADMIN — ENOXAPARIN SODIUM 40 MILLIGRAM(S): 100 INJECTION SUBCUTANEOUS at 05:14

## 2025-07-15 RX ADMIN — AMLODIPINE BESYLATE 5 MILLIGRAM(S): 10 TABLET ORAL at 05:14

## 2025-07-15 NOTE — PROGRESS NOTE ADULT - ATTENDING COMMENTS
Patient is a 85F with a history of dementia who presented after an argument with her family with aggressive behavior.     Patient seen and examined at bedside. No acute events overnight. No new complaints. Seen by PT - rec MICHAEL . Medically stable.
Patient is a 85F with a history of dementia who presented after an argument with her family with aggressive behavior.     Seen by PT - rec MICHAEL
Patient is a 85F with a history of dementia who presented after an argument with her family with aggressive behavior.     Patient seen and examined at bedside. No acute events overnight. No new complaints. Discussed with granddaughter at bedside, plan now for home. HCP being established.

## 2025-07-15 NOTE — DISCHARGE NOTE NURSING/CASE MANAGEMENT/SOCIAL WORK - PATIENT PORTAL LINK FT
You can access the FollowMyHealth Patient Portal offered by [facility] by registering at the following website: [email or web address]. By joining [employer]’s FollowMyHealth portal, you will also be able to view your health information using other applications (apps) compatible with our system.

## 2025-07-15 NOTE — PROGRESS NOTE ADULT - ASSESSMENT
85F with a history of dementia who presented after an argument with her family with aggressive behavior.    Dementia  - CT head was without acute intracranial pathology  - Urinalysis was not indicative of infection  - Urine toxicology was negative  - Behavioral health evaluation noted  - Psych consulted, on olanzapine 5 mg at bed time  - Continue with supportive care  - Physical Therapy evaluation noted, thought to benefit from further treatment at an inpatient rehabilitation facility    Hypokalemia (resolved)  - 3.2 on admission , 3.6 7/13, 3.5 7/14  - Continue to monitor    Disposition: Anticipate discharge to a rehabilitation facility in 1-2 days pending arrangement. 85F with a history of dementia who presented after an argument with her family with aggressive behavior.    Dementia  - CT head was without acute intracranial pathology  - Urinalysis was not indicative of infection  - Urine toxicology was negative  - Behavioral health evaluation noted  - Psych consulted, on olanzapine 5 mg at bed time  - Continue with supportive care  - Physical Therapy evaluation noted, thought to benefit from further treatment at an inpatient rehabilitation facility    Hypokalemia (resolved)  - 3.2 on admission , 3.6 7/13, 3.5 7/14  - Continue to monitor    Disposition: Pending decision of healthcare proxy between family members

## 2025-07-15 NOTE — CONSULT NOTE ADULT - ASSESSMENT
Conclusion: In this case, on 7/15/2025, Sandy Hudson completed a Health Care Proxy form naming her granddaughter, Lois Butler, as her primary health care agent, and her daughter, Margot Moore, as her alternate health care agent. [facility] law and current ethical thinking support the right of the patient’s HCP to make medical decisions, on the patient’s behalf, IF the patient is without capacity. The team should engage with Lois Butler for medical decision making ONLY IF the patient is without capacity.     A physical copy of the patient’s HCP form was placed in the patient’s chart by Ethics and uploaded to [employer] on 7/15/2025.     Ethics would like to thank the team for involving us in the care of Ms. Hudson. Please do not hesitate to call us if there are any questions. The Ethics Service will remain available for further conversation about the patient’s current condition and decision-points that may occur.       Case discussed with Medical Ethicist Pamella Henriquez MD, MS, MPH, [facility] ( of Medical Ethics)     More than 50% of the time of this consultation was spent in coordination of Care of Patient        References   (i) Michela TL & Kamlesh JF. Principles of Biomedical Ethics. [place]: [employer]; 2019   (ii) NAVEEN Woody. Assessment of patients’ competence to consent to treatment. NEJM. 2007; 357:4053-6507.   (iii) [facility] PHL § 2982.1 (2020).   (iv) [facility] PHL § 2982.2 (2020).   (v) Id.   (vi) The Health Care Proxy Law Guidebook 1991 https://www.health.[place].gov/regulations/task_force/reports_publications/docs/the_health_care_proxy_law_guidebook.pdfh  Conclusion: In this case, on 7/15/2025, Sandy Hudson completed a Health Care Proxy form naming her granddaughter, Lois Butler, as her primary health care agent, and her daughter, Margot Moore, as her alternate health care agent. [facility] law and current ethical thinking support the right of the patient’s HCP to make medical decisions, on the patient’s behalf, IF the patient is without capacity. The team should engage with Lois Butler for medical decision making ONLY IF the patient is without capacity.     A physical copy of the patient’s HCP form was placed in the patient’s chart by Ethics and uploaded to [employer] on 7/15/2025.     Ethics would like to thank the team for involving us in the care of Ms. Hudson. Please do not hesitate to call us if there are any questions. The Ethics Service will remain available for further conversation about the patient’s current condition and decision-points that may occur.     Case discussed with Medical Ethicist Pamella Henriquez MD, MS, MPH, [facility] ( of Medical Ethics)     More than 50% of the time of this consultation was spent in coordination of Care of Patient      References   (i) Michela TL & Kamlesh JF. Principles of Biomedical Ethics. [place]: [employer]; 2019   (ii) NAVEEN Woody. Assessment of patients’ competence to consent to treatment. NEJM. 2007; 357:0615-1899.   (iii) [facility] PHL § 2982.1 (2020).   (iv) [facility] PHL § 2982.2 (2020).   (v) Id.   (vi) The Health Care Proxy Law Guidebook 1991 https://www.health.[place].gov/regulations/task_force/reports_publications/docs/the_health_care_proxy_law_guidebook.pdfh  Conclusion: In this case, on 7/15/2025, Sandy Hudson completed a Health Care Proxy form naming her granddaughter, Lois Butler, as her primary health care agent, and her daughter, Margot Moore, as her alternate health care agent. [facility] law and current ethical thinking support the right of the patient’s HCP to make medical decisions, on the patient’s behalf, IF the patient is without capacity. The team should engage with Lois Butler for medical decision making ONLY IF the patient is without capacity.     A physical copy of the patient’s HCP form was placed in the patient’s chart by Ethics and uploaded to [employer] on 7/15/2025.     The HCP completed on 7/13/2025 was notated to reflect prior HCP and returned to the physical chart. The HCP completed today supercedes the prior HCP.     Ethics would like to thank the team for involving us in the care of Ms. Hudson. Please do not hesitate to call us if there are any questions. The Ethics Service will remain available for further conversation about the patient’s current condition and decision-points that may occur.     Case discussed with Medical Ethicist Pamella Henriquez MD, MS, MPH, [facility] ( of Medical Ethics)     More than 50% of the time of this consultation was spent in coordination of Care of Patient      References   (i) Michela TL & Kamlesh JF. Principles of Biomedical Ethics. [place]: [employer]; 2019   (ii) Bong PS. Assessment of patients’ competence to consent to treatment. NEJM. 2007; 357:7823-7103.   (iii) [facility] PHL § 2982.1 (2020).   (iv) [facility] PHL § 2982.2 (2020).   (v) Id.   (vi) The Health Care Proxy Law Guidebook 1991 https://www.health.[place].gov/regulations/task_force/reports_publications/docs/the_health_care_proxy_law_guidebook.pdfh

## 2025-07-15 NOTE — DISCHARGE NOTE NURSING/CASE MANAGEMENT/SOCIAL WORK - FINANCIAL ASSISTANCE
[facility] provides services at a reduced cost to those who are determined to be eligible through [facility]’s financial assistance program. Information regarding [facility]’s financial assistance program can be found by going to [email or web address] or by calling [phone].

## 2025-07-15 NOTE — PROGRESS NOTE ADULT - SUBJECTIVE AND OBJECTIVE BOX
Patient is a 85y old  Female who presents with a chief complaint of Aggressive Behavior (13 Jul 2025 15:23)    SUBJECTIVE:    BRIEF HOSPITAL COURSE: 85F with a history of dementia who presented to the hospital following an argument wit her family and becoming combative. After initial evaluation, the patient was placed under observational status in the emergency department. She was evaluated by Physical Therapy and thought to benefit from treatment at a rehabilitation facility. The patient was also evaluated by Psychiatry and olanzapine was initiated. The patient remained in the emergency department while awaiting placement without any events. Admission was requested while awaiting treatment at a rehabilitation facility. CT Head negative for acute intracranial pathology. Urinalysis and Urine toxicology negative. Hypokalemic on initial presentation, repeat wnl.    OVERNIGHT EVENTS/INTERVAL HPI: Today is hospital day 3. No acute events overnight. Patient seen and examined at bedside. AxO x 1.       MEDICATIONS  (STANDING):  amLODIPine   Tablet 5 milliGRAM(s) Oral daily  enoxaparin Injectable 40 milliGRAM(s) SubCutaneous every 24 hours  OLANZapine 5 milliGRAM(s) Oral at bedtime    MEDICATIONS  (PRN):      Allergies    No Known Allergies    Intolerances        REVIEW OF SYSTEMS: Review of systems is otherwise negative unless stated in the HPI above.    OBJECTIVE:  Vital Signs Last 24 Hrs  T(C): 36.3 (15 Jul 2025 04:53), Max: 36.6 (14 Jul 2025 08:30)  T(F): 97.4 (15 Jul 2025 04:53), Max: 97.8 (14 Jul 2025 08:30)  HR: 60 (15 Jul 2025 04:53) (60 - 82)  BP: 150/78 (15 Jul 2025 04:53) (133/66 - 150/78)  BP(mean): --  RR: 18 (15 Jul 2025 04:53) (17 - 18)  SpO2: 97% (15 Jul 2025 04:53) (93% - 100%)    Parameters below as of 15 Jul 2025 04:53  Patient On (Oxygen Delivery Method): room air        PHYSICAL EXAM:    GENERAL: no acute distress, comfortably in bed, did not wish to speak  HEENT: Atraumatic, normocephalic, non-icteric, no JVD  NEURO: No focal deficits, moving all extremities spontaneously, A&Ox3, no dysarthria, CN II-XII grossly intact  PSYCH: Normal affect, calm, appropriate insight and judgment, fluent speech  LUNGS: CTAB, no wrr, non-labored breathing  HEART: RRR, no murmur appreciated  ABD: Soft, non-tender, non-distended, no organomegaly, no appreciable masses, +bs all 4 quadrants  EXTREMITIES: Nontender, no clubbing, cyanosis, or edema  SKIN: No rashes or lesions      LABS:    07-15    140  |  105  |  20.2[H]  ----------------------------<  76  3.7   |  24.0  |  1.00    Ca    8.7      15 Jul 2025 06:02        Urinalysis Basic - ( 15 Jul 2025 06:02 )    Color: x / Appearance: x / SG: x / pH: x  Gluc: 76 mg/dL / Ketone: x  / Bili: x / Urobili: x   Blood: x / Protein: x / Nitrite: x   Leuk Esterase: x / RBC: x / WBC x   Sq Epi: x / Non Sq Epi: x / Bacteria: x      CAPILLARY BLOOD GLUCOSE          RADIOLOGY & ADDITIONAL TESTS:     Patient is a 85y old  Female who presents with a chief complaint of Aggressive Behavior (13 Jul 2025 15:23)    SUBJECTIVE:    BRIEF HOSPITAL COURSE: 85F with a history of dementia who presented to the hospital following an argument wit her family and becoming combative. After initial evaluation, the patient was placed under observational status in the emergency department. She was evaluated by Physical Therapy and thought to benefit from treatment at a rehabilitation facility. The patient was also evaluated by Psychiatry and olanzapine was initiated. The patient remained in the emergency department while awaiting placement without any events. Admission was requested while awaiting treatment at a rehabilitation facility. CT Head negative for acute intracranial pathology. Urinalysis and Urine toxicology negative. Hypokalemic on initial presentation, repeat wnl.    OVERNIGHT EVENTS/INTERVAL HPI: Today is hospital day 3. No acute events overnight. Patient seen and examined at bedside. AxO x 2. Patient has no complaints at this time.      MEDICATIONS  (STANDING):  amLODIPine   Tablet 5 milliGRAM(s) Oral daily  enoxaparin Injectable 40 milliGRAM(s) SubCutaneous every 24 hours  OLANZapine 5 milliGRAM(s) Oral at bedtime    MEDICATIONS  (PRN):      Allergies    No Known Allergies    Intolerances        REVIEW OF SYSTEMS: Review of systems is otherwise negative unless stated in the HPI above.    OBJECTIVE:  Vital Signs Last 24 Hrs  T(C): 36.3 (15 Jul 2025 04:53), Max: 36.6 (14 Jul 2025 08:30)  T(F): 97.4 (15 Jul 2025 04:53), Max: 97.8 (14 Jul 2025 08:30)  HR: 60 (15 Jul 2025 04:53) (60 - 82)  BP: 150/78 (15 Jul 2025 04:53) (133/66 - 150/78)  BP(mean): --  RR: 18 (15 Jul 2025 04:53) (17 - 18)  SpO2: 97% (15 Jul 2025 04:53) (93% - 100%)    Parameters below as of 15 Jul 2025 04:53  Patient On (Oxygen Delivery Method): room air        PHYSICAL EXAM:    GENERAL: no acute distress, comfortably in bed, did not wish to speak  HEENT: Atraumatic, normocephalic, non-icteric, no JVD  NEURO: No focal deficits, moving all extremities spontaneously, A&Ox3, no dysarthria, CN II-XII grossly intact  PSYCH: Normal affect, calm, appropriate insight and judgment, fluent speech  LUNGS: CTAB, no wrr, non-labored breathing  HEART: RRR, no murmur appreciated  ABD: Soft, non-tender, non-distended, no organomegaly, no appreciable masses, +bs all 4 quadrants  EXTREMITIES: Nontender, no clubbing, cyanosis, or edema  SKIN: No rashes or lesions      LABS:    07-15    140  |  105  |  20.2[H]  ----------------------------<  76  3.7   |  24.0  |  1.00    Ca    8.7      15 Jul 2025 06:02        Urinalysis Basic - ( 15 Jul 2025 06:02 )    Color: x / Appearance: x / SG: x / pH: x  Gluc: 76 mg/dL / Ketone: x  / Bili: x / Urobili: x   Blood: x / Protein: x / Nitrite: x   Leuk Esterase: x / RBC: x / WBC x   Sq Epi: x / Non Sq Epi: x / Bacteria: x      CAPILLARY BLOOD GLUCOSE          RADIOLOGY & ADDITIONAL TESTS:

## 2025-07-15 NOTE — DISCHARGE NOTE NURSING/CASE MANAGEMENT/SOCIAL WORK - NSDCFUADDAPPT_GEN_ALL_CORE_FT
APPTS ARE READY TO BE MADE: [X] YES    Additional Information about above appointments (if needed):    1: Please follow up with your primary care provider  2: Please follow up with a neurologist for management of dementia

## 2025-07-15 NOTE — CONSULT NOTE ADULT - CONSULT REASON
To assist with the ethical dilemma posed by an 85-year-old female admitted while awaiting subacute rehabilitation placement, regarding surrogacy. To assist with the ethical dilemma posed by an 85-year-old female with a complex medical history, admitted while awaiting subacute rehabilitation placement, regarding surrogacy.

## 2025-07-15 NOTE — DISCHARGE NOTE NURSING/CASE MANAGEMENT/SOCIAL WORK - NSDCPEFALRISK_GEN_ALL_CORE
For information on Fall & Injury Prevention, visit: [email or web address] OR  [email or web address] OR  https://www.cdc.gov/steadi/patient.html

## 2025-07-15 NOTE — CONSULT NOTE ADULT - SUBJECTIVE AND OBJECTIVE BOX
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 clearly communicated her choice to name her granddaughter, Lois, as her primary agent and her daughter, Margot, as her alternate agent.     Under [place] law, patients can appoint an HCP to make “any and all health care decisions on the principal’s behalf that the principal could make” (iii). This, however, is subject to specific limitations. The HCP shall make medical decisions “in accordance with the principal’s wishes, including the principal’s [employer] and moral beliefs” (iv). If the patient’s wishes are unknown, then a health care agent can use their substituted judgment to make decisions in accordance with the patient’s best interests (v).     Under [facility] Health Care Proxy Law (iii), a competent adult may appoint a health care agent by a HCP, signed and dated by the adult in the presence of two adult witnesses who shall also sign the proxy. Another person may sign and date the health care proxy for the adult if the adult is unable to do so, at the adult's direction and in the adult's presence, and in the presence of two adult witnesses who shall sign the proxy (iii). The witnesses shall state that the principal appeared to execute the proxy willingly and free from duress (iii). The person appointed as agent shall not act as witness to execution of the health care proxy (ii). The agent's authority shall commence upon a determination that the principal lacks capacity to make health care decisions (iii).     Individuals should choose a person they trust to protect their wishes and interests (vi). Each person can appoint only one agent, but individuals may name an alternate agent (vi). The alternate serves in place of the agent if one of three circumstances occurs: 1) the attending physician determines that the agent is not reasonably available, willing and competent to serve, and is not expected to become so in a manner that will permit him or her to make a timely decision given the patient’s medical circumstances; 2) a court disqualifies the agent from serving; 3) conditions described by the patient in the proxy occur (vi). If the person appointed as the original agent becomes available to serve after an alternate agent has begun to make health care decisions, the alternate’s authority ends, and the original agent’s authority begins (vi).     A proxy remains effective indefinitely unless the proxy document includes an expiration date or a description of circumstances that trigger expiration (vi). Even if a proxy contains an expiration date, if the patient loses decision-making capacity before that date, the agent continues to have decision-making authority for as long as the patient lacks capacity (vi). An adult with capacity can revoke a health care proxy by any words or actions showing an intention to revoke, or by creating a new proxy (vi).